# Patient Record
Sex: FEMALE | Race: WHITE | URBAN - METROPOLITAN AREA
[De-identification: names, ages, dates, MRNs, and addresses within clinical notes are randomized per-mention and may not be internally consistent; named-entity substitution may affect disease eponyms.]

---

## 2023-02-15 ENCOUNTER — OFFICE VISIT (OUTPATIENT)
Dept: INTERNAL MEDICINE CLINIC | Facility: CLINIC | Age: 43
End: 2023-02-15
Payer: COMMERCIAL

## 2023-02-15 VITALS
RESPIRATION RATE: 16 BRPM | BODY MASS INDEX: 39.83 KG/M2 | DIASTOLIC BLOOD PRESSURE: 70 MMHG | SYSTOLIC BLOOD PRESSURE: 120 MMHG | WEIGHT: 233.31 LBS | OXYGEN SATURATION: 96 % | HEIGHT: 64 IN | HEART RATE: 94 BPM

## 2023-02-15 DIAGNOSIS — E66.01 CLASS 3 SEVERE OBESITY WITH BODY MASS INDEX (BMI) OF 40.0 TO 44.9 IN ADULT, UNSPECIFIED OBESITY TYPE, UNSPECIFIED WHETHER SERIOUS COMORBIDITY PRESENT (HCC): ICD-10-CM

## 2023-02-15 DIAGNOSIS — Z00.00 ANNUAL PHYSICAL EXAM: Primary | ICD-10-CM

## 2023-02-15 DIAGNOSIS — Z23 IMMUNIZATION DUE: ICD-10-CM

## 2023-02-15 DIAGNOSIS — F41.9 ANXIETY: ICD-10-CM

## 2023-02-15 DIAGNOSIS — Z12.31 ENCOUNTER FOR SCREENING MAMMOGRAM FOR MALIGNANT NEOPLASM OF BREAST: ICD-10-CM

## 2023-02-15 DIAGNOSIS — Z86.2 HISTORY OF ANEMIA: ICD-10-CM

## 2023-02-15 DIAGNOSIS — E07.9 THYROID DISEASE: ICD-10-CM

## 2023-02-15 DIAGNOSIS — Z12.4 CERVICAL CANCER SCREENING: ICD-10-CM

## 2023-02-15 PROBLEM — D50.0 IRON DEFICIENCY ANEMIA SECONDARY TO BLOOD LOSS (CHRONIC): Status: ACTIVE | Noted: 2021-01-27

## 2023-02-15 PROBLEM — D50.0 IRON DEFICIENCY ANEMIA SECONDARY TO BLOOD LOSS (CHRONIC): Status: RESOLVED | Noted: 2021-01-27 | Resolved: 2023-02-15

## 2023-02-15 PROCEDURE — 90471 IMMUNIZATION ADMIN: CPT | Performed by: INTERNAL MEDICINE

## 2023-02-15 PROCEDURE — 90686 IIV4 VACC NO PRSV 0.5 ML IM: CPT | Performed by: INTERNAL MEDICINE

## 2023-02-15 PROCEDURE — 99386 PREV VISIT NEW AGE 40-64: CPT | Performed by: INTERNAL MEDICINE

## 2023-02-15 PROCEDURE — 3008F BODY MASS INDEX DOCD: CPT | Performed by: INTERNAL MEDICINE

## 2023-02-15 PROCEDURE — 3078F DIAST BP <80 MM HG: CPT | Performed by: INTERNAL MEDICINE

## 2023-02-15 PROCEDURE — 99204 OFFICE O/P NEW MOD 45 MIN: CPT | Performed by: INTERNAL MEDICINE

## 2023-02-15 PROCEDURE — 3074F SYST BP LT 130 MM HG: CPT | Performed by: INTERNAL MEDICINE

## 2023-02-15 RX ORDER — NORGESTIMATE AND ETHINYL ESTRADIOL 7DAYSX3 28
1 KIT ORAL DAILY
COMMUNITY
Start: 2022-12-01 | End: 2023-02-15 | Stop reason: ALTCHOICE

## 2023-02-15 RX ORDER — MULTIVITAMIN
1 TABLET ORAL DAILY
COMMUNITY

## 2023-02-23 ENCOUNTER — HOSPITAL ENCOUNTER (OUTPATIENT)
Dept: MAMMOGRAPHY | Age: 43
Discharge: HOME | End: 2023-02-23
Attending: INTERNAL MEDICINE
Payer: COMMERCIAL

## 2023-02-23 DIAGNOSIS — Z00.00 ANNUAL PHYSICAL EXAM: ICD-10-CM

## 2023-02-23 DIAGNOSIS — Z12.31 ENCOUNTER FOR SCREENING MAMMOGRAM FOR MALIGNANT NEOPLASM OF BREAST: ICD-10-CM

## 2023-02-23 PROCEDURE — 77063 BREAST TOMOSYNTHESIS BI: CPT | Performed by: INTERNAL MEDICINE

## 2023-02-23 PROCEDURE — 77067 SCR MAMMO BI INCL CAD: CPT | Performed by: INTERNAL MEDICINE

## 2023-02-24 ENCOUNTER — TELEPHONE (OUTPATIENT)
Dept: INTERNAL MEDICINE CLINIC | Facility: CLINIC | Age: 43
End: 2023-02-24

## 2023-03-01 ENCOUNTER — TELEPHONE (OUTPATIENT)
Dept: INTERNAL MEDICINE CLINIC | Facility: CLINIC | Age: 43
End: 2023-03-01

## 2023-03-01 NOTE — TELEPHONE ENCOUNTER
Received labs from 2/28/23:  Please let her know that thyroid/renal/liver labs wnl. Vitamin d/iron wnl. Vitamin b12 and folate above goal; can decrease those supplements. Not anemic or diabetic. Cholesterol overall controlled. Sent to scan.

## 2023-03-06 ENCOUNTER — TELEPHONE (OUTPATIENT)
Dept: INTERNAL MEDICINE CLINIC | Facility: CLINIC | Age: 43
End: 2023-03-06

## 2023-05-09 ENCOUNTER — OFFICE VISIT (OUTPATIENT)
Dept: INTERNAL MEDICINE CLINIC | Facility: CLINIC | Age: 43
End: 2023-05-09
Payer: COMMERCIAL

## 2023-05-09 VITALS
TEMPERATURE: 98 F | SYSTOLIC BLOOD PRESSURE: 112 MMHG | RESPIRATION RATE: 15 BRPM | BODY MASS INDEX: 40 KG/M2 | WEIGHT: 234.63 LBS | OXYGEN SATURATION: 97 % | DIASTOLIC BLOOD PRESSURE: 62 MMHG | HEART RATE: 74 BPM

## 2023-05-09 DIAGNOSIS — L30.9 DERMATITIS: Primary | ICD-10-CM

## 2023-05-09 DIAGNOSIS — E66.01 CLASS 3 SEVERE OBESITY DUE TO EXCESS CALORIES WITH BODY MASS INDEX (BMI) OF 40.0 TO 44.9 IN ADULT, UNSPECIFIED WHETHER SERIOUS COMORBIDITY PRESENT (HCC): ICD-10-CM

## 2023-05-09 DIAGNOSIS — I87.2 VENOUS INSUFFICIENCY: ICD-10-CM

## 2023-05-09 PROCEDURE — 3074F SYST BP LT 130 MM HG: CPT | Performed by: INTERNAL MEDICINE

## 2023-05-09 PROCEDURE — 3078F DIAST BP <80 MM HG: CPT | Performed by: INTERNAL MEDICINE

## 2023-05-09 PROCEDURE — 99214 OFFICE O/P EST MOD 30 MIN: CPT | Performed by: INTERNAL MEDICINE

## 2023-05-09 RX ORDER — TRIAMCINOLONE ACETONIDE 1 MG/G
CREAM TOPICAL 2 TIMES DAILY PRN
Qty: 60 G | Refills: 0 | Status: SHIPPED | OUTPATIENT
Start: 2023-05-09 | End: 2023-05-23

## 2023-07-25 ENCOUNTER — OFFICE VISIT (OUTPATIENT)
Dept: INTERNAL MEDICINE CLINIC | Facility: CLINIC | Age: 43
End: 2023-07-25
Payer: COMMERCIAL

## 2023-07-25 VITALS
DIASTOLIC BLOOD PRESSURE: 60 MMHG | HEART RATE: 82 BPM | OXYGEN SATURATION: 97 % | RESPIRATION RATE: 16 BRPM | WEIGHT: 237 LBS | BODY MASS INDEX: 41 KG/M2 | TEMPERATURE: 98 F | SYSTOLIC BLOOD PRESSURE: 110 MMHG

## 2023-07-25 DIAGNOSIS — U07.1 COVID: Primary | ICD-10-CM

## 2023-07-25 DIAGNOSIS — Z01.89 ROUTINE LAB DRAW: ICD-10-CM

## 2023-07-25 DIAGNOSIS — R09.81 NASAL CONGESTION: ICD-10-CM

## 2023-07-25 DIAGNOSIS — E66.01 CLASS 3 SEVERE OBESITY DUE TO EXCESS CALORIES WITH BODY MASS INDEX (BMI) OF 40.0 TO 44.9 IN ADULT, UNSPECIFIED WHETHER SERIOUS COMORBIDITY PRESENT (HCC): ICD-10-CM

## 2023-07-25 DIAGNOSIS — R09.89 CHEST CONGESTION: ICD-10-CM

## 2023-07-25 DIAGNOSIS — I87.2 VENOUS INSUFFICIENCY: ICD-10-CM

## 2023-07-25 PROCEDURE — 3074F SYST BP LT 130 MM HG: CPT | Performed by: INTERNAL MEDICINE

## 2023-07-25 PROCEDURE — 99214 OFFICE O/P EST MOD 30 MIN: CPT | Performed by: INTERNAL MEDICINE

## 2023-07-25 PROCEDURE — 3078F DIAST BP <80 MM HG: CPT | Performed by: INTERNAL MEDICINE

## 2023-07-25 RX ORDER — ALBUTEROL SULFATE 90 UG/1
2 AEROSOL, METERED RESPIRATORY (INHALATION) EVERY 4 HOURS PRN
Qty: 1 EACH | Refills: 0 | Status: SHIPPED | OUTPATIENT
Start: 2023-07-25

## 2023-07-25 RX ORDER — FLUTICASONE PROPIONATE 50 MCG
2 SPRAY, SUSPENSION (ML) NASAL DAILY
Qty: 18.2 ML | Refills: 0 | Status: SHIPPED | OUTPATIENT
Start: 2023-07-25

## 2023-09-27 ENCOUNTER — TELEPHONE (OUTPATIENT)
Dept: INTERNAL MEDICINE CLINIC | Facility: CLINIC | Age: 43
End: 2023-09-27

## 2023-09-27 NOTE — TELEPHONE ENCOUNTER
Ximena Acosta with [facility] called stating the patient's spouse called them about Labs that were done on 2/28/2023 and 2/24/2023. Spouse states patient did NOT have labs done on 2/24/2023 and needs our office to call   [phone] ([facility]) to dispute these labs on 2/24/2023.      Note patient has results posted for both 2/24 and 2/28.    [phone] Billing    Please advise if patient did indeed need these labs both days and verify with [facility].

## 2023-09-27 NOTE — TELEPHONE ENCOUNTER
Spoke with patient-states she had originally gone to [facility] on 2/24/23 and had her labs drawn. Was later called by [facility] stating they needed her to go back in as they had received additional lab orders (apparently initial faxed orders were only partially received). At that point patient was no longer fasting. She was advised to return on 2/28/23. When she returned on 2/28/23 she was advised that she would not be double billed for 2/24/23 draw. She is now receiving a bill for both dates of service. Tessa and spoke to Eleanor @[phone]. Advised Radha of above. Also informed her DV only ordered 1 set of labs on 2/15/23. Per Radha the following was drawn:    2/24/23 TSH, FREE T4, LIPID PANEL THYROID PEROXIDASE    2/28/23 TSH, FREE T4, LIPID PANEL THYROID PEROXIDASE, B12, FOLATE, IRON, TIBC, HGBA1C, CMP, CBC W/DIFF, VIT D    At:   [facility] [address], [facility] [address]. Per Eleanor, no record of pt being told she would not be billed for DOS 2/24/23. Relayed this to pt; unsure if there is anything else we can do. Please advise-TY! *Pt aware  CARLYN; will await response/CB next week.

## 2023-10-09 ENCOUNTER — OFFICE VISIT (OUTPATIENT)
Dept: INTERNAL MEDICINE CLINIC | Facility: CLINIC | Age: 43
End: 2023-10-09
Payer: COMMERCIAL

## 2023-10-09 VITALS
HEIGHT: 64 IN | SYSTOLIC BLOOD PRESSURE: 118 MMHG | OXYGEN SATURATION: 99 % | RESPIRATION RATE: 16 BRPM | WEIGHT: 237.63 LBS | DIASTOLIC BLOOD PRESSURE: 68 MMHG | HEART RATE: 92 BPM | BODY MASS INDEX: 40.57 KG/M2 | TEMPERATURE: 98 F

## 2023-10-09 DIAGNOSIS — E66.01 CLASS 3 SEVERE OBESITY WITH BODY MASS INDEX (BMI) OF 40.0 TO 44.9 IN ADULT, UNSPECIFIED OBESITY TYPE, UNSPECIFIED WHETHER SERIOUS COMORBIDITY PRESENT (HCC): ICD-10-CM

## 2023-10-09 DIAGNOSIS — Z12.4 CERVICAL CANCER SCREENING: ICD-10-CM

## 2023-10-09 DIAGNOSIS — N92.6 IRREGULAR MENSES: ICD-10-CM

## 2023-10-09 DIAGNOSIS — N93.9 ABNORMAL UTERINE BLEEDING (AUB): Primary | ICD-10-CM

## 2023-10-09 PROCEDURE — 99214 OFFICE O/P EST MOD 30 MIN: CPT | Performed by: INTERNAL MEDICINE

## 2023-10-09 PROCEDURE — 3008F BODY MASS INDEX DOCD: CPT | Performed by: INTERNAL MEDICINE

## 2023-10-09 PROCEDURE — 3074F SYST BP LT 130 MM HG: CPT | Performed by: INTERNAL MEDICINE

## 2023-10-09 PROCEDURE — 3078F DIAST BP <80 MM HG: CPT | Performed by: INTERNAL MEDICINE

## 2023-10-09 RX ORDER — RIBOFLAVIN (VITAMIN B2) 100 MG
100 TABLET ORAL DAILY
COMMUNITY

## 2023-10-09 RX ORDER — MEDROXYPROGESTERONE ACETATE 10 MG/1
10 TABLET ORAL DAILY
Qty: 30 TABLET | Refills: 0 | Status: SHIPPED | OUTPATIENT
Start: 2023-10-09

## 2023-10-09 RX ORDER — MEDROXYPROGESTERONE ACETATE 10 MG/1
10 TABLET ORAL DAILY
Qty: 30 TABLET | Refills: 0 | Status: SHIPPED | OUTPATIENT
Start: 2023-10-09 | End: 2023-10-09

## 2023-10-10 ENCOUNTER — PATIENT MESSAGE (OUTPATIENT)
Dept: INTERNAL MEDICINE CLINIC | Facility: CLINIC | Age: 43
End: 2023-10-10

## 2023-10-10 DIAGNOSIS — N92.6 IRREGULAR MENSES: ICD-10-CM

## 2023-10-10 DIAGNOSIS — N93.9 ABNORMAL UTERINE BLEEDING (AUB): Primary | ICD-10-CM

## 2023-10-10 NOTE — TELEPHONE ENCOUNTER
Language line used:   [facility] Agent [id number]    Called and spoke to patient's . He is to have patient wait for call or call office back. . Attempted to call patient two times. Number goes straight to [facility] and [facility] is not set up. Unable to leave message.

## 2023-10-10 NOTE — TELEPHONE ENCOUNTER
Should keep that gyne appt and ask to be placed on wait list for sooner appt. Please let her know that the US ordered will show the uterus and ovaries/pelvic area. However,  will change to transvaginal/transabdominal pelvic US since she is comfortable with that.

## 2023-10-10 NOTE — TELEPHONE ENCOUNTER
Earliest appt patient could schedule is Nov 6  OK to wait for appt or rec another ob/gyn?      Also asks if vaginal US would be better than abdominal ?     Patient is Sinhala speaking,  needed    [facility] #[id number]

## 2023-10-11 ENCOUNTER — PATIENT MESSAGE (OUTPATIENT)
Dept: INTERNAL MEDICINE CLINIC | Facility: CLINIC | Age: 43
End: 2023-10-11

## 2023-10-11 ENCOUNTER — TELEPHONE (OUTPATIENT)
Dept: INTERNAL MEDICINE CLINIC | Facility: CLINIC | Age: 43
End: 2023-10-11

## 2023-10-11 DIAGNOSIS — N93.9 ABNORMAL UTERINE BLEEDING (AUB): Primary | ICD-10-CM

## 2023-10-11 DIAGNOSIS — N92.6 IRREGULAR MENSES: ICD-10-CM

## 2023-10-11 DIAGNOSIS — E66.01 CLASS 3 SEVERE OBESITY WITH BODY MASS INDEX (BMI) OF 40.0 TO 44.9 IN ADULT, UNSPECIFIED OBESITY TYPE, UNSPECIFIED WHETHER SERIOUS COMORBIDITY PRESENT (HCC): ICD-10-CM

## 2023-10-11 NOTE — TELEPHONE ENCOUNTER
I honestly don't know why lab orders are not visible. There are previous orders for [facility] and [facility] (so patient has lab choice if issues). Re-ordered labs again under [facility]. Please follow-up with pt /lab location. If needs alternative lab location orders then to tell us where.

## 2023-10-11 NOTE — TELEPHONE ENCOUNTER
Pt excessively called the back line today to get lab orders faxed to [facility]. She was asked to not call the back line and to press option #2 when calling the office but she continued to do so. Pt called well over 5 times back to back today. I spoke to her as well as Yasmeen Palma. We both faxed the orders multiple times. Pt eventually was able to provide another fax number where the orders were sent and confirmation was received. Im unsure why this happens to (some) patients where [facility] is unable to locate orders on their end which is why we advise patients to  the orders from the office prior to going to [facility] or provide their fax number ahead of time.

## 2023-10-11 NOTE — TELEPHONE ENCOUNTER
Pt requested to have lab orders faxed to [address] [place] [address] #: [phone]    Orders faxed.

## 2023-10-13 ENCOUNTER — PATIENT MESSAGE (OUTPATIENT)
Dept: INTERNAL MEDICINE CLINIC | Facility: CLINIC | Age: 43
End: 2023-10-13

## 2023-10-13 ENCOUNTER — TELEPHONE (OUTPATIENT)
Dept: INTERNAL MEDICINE CLINIC | Facility: CLINIC | Age: 43
End: 2023-10-13

## 2023-10-13 DIAGNOSIS — D64.9 ANEMIA, UNSPECIFIED TYPE: Primary | ICD-10-CM

## 2023-10-13 LAB
ABSOLUTE BASOPHILS: 0.1
ABSOLUTE EOSINOPHILS: 0.5
ABSOLUTE LYMPHOCYTES: 1.7
ABSOLUTE MONOCYTES: 0.5
ALBUMIN/GLOBULIN RATIO: 1.8
ALBUMIN: 4.2 G/DL
ALKALINE PHOSPHATASE: 67
ALT: 15
AST: 21
BASOPHIL %: 1
BILIRUBIN, TOTAL: <0.2 MG/DL
BUN/CREATININE RATIO: 14
BUN: 10
CALCIUM: 8.6 MG/DL
CARBON DIOXIDE, TOTAL: 21 MMOL/L
CHLORIDE: 104
CORTISOL: 6.8 UG/DL
CREATININE: 0.7 MG/DL
EOSINOPHIL %: 6
GFR: 111 ML/MIN/1.73 SQUARE METERS (ref ?–60)
GLOBULIN: 2.4
GLUCOSE: 87
HCG,BETA SUBUNIT,QNT,SERUM: <1 MIU/ML
HEMATOCRIT: 24.8 %
HEMOGLOBIN: 7.9
LYMPHOCYTE %: 20
MCH: 28 PG
MCHC: 31.9 G/DL
MCV: 88 FL
MONOCYTE %: 6
NEUTROPHIL %: 66
NEUTROPHIL ABSOLUTE: 5.7
NRBC: 1
PLATELET COUNT: 373
POTASSIUM: 4.8
RBC: 2.82 X 10-6/UL
RDW: 14.2 %
SODIUM: 139
TOTAL PROTEIN: 6.6
TSH: 2.32 UIU/ML
VITAMIN D, 25-HYDROXY: 36.5 NG/ML
WBC: 8.6 X 10-3/UL

## 2023-10-16 ENCOUNTER — TELEPHONE (OUTPATIENT)
Dept: INTERNAL MEDICINE CLINIC | Facility: CLINIC | Age: 43
End: 2023-10-16

## 2023-10-16 ENCOUNTER — HOSPITAL ENCOUNTER (EMERGENCY)
Facility: HOSPITAL | Age: 43
Discharge: HOME | End: 2023-10-17
Attending: EMERGENCY MEDICINE
Payer: COMMERCIAL

## 2023-10-16 ENCOUNTER — APPOINTMENT (OUTPATIENT)
Dept: ULTRASOUND IMAGING | Facility: HOSPITAL | Age: 43
End: 2023-10-16
Attending: EMERGENCY MEDICINE
Payer: COMMERCIAL

## 2023-10-16 DIAGNOSIS — D64.9 SEVERE ANEMIA: Primary | ICD-10-CM

## 2023-10-16 DIAGNOSIS — N93.8 DUB (DYSFUNCTIONAL UTERINE BLEEDING): ICD-10-CM

## 2023-10-16 LAB
ALBUMIN SERPL-MCNC: 3.7 G/DL (ref 3.4–5)
ALBUMIN/GLOB SERPL: 0.9 {RATIO} (ref 1–2)
ALP LIVER SERPL-CCNC: 70 U/L
ALT SERPL-CCNC: 23 U/L
ANION GAP SERPL CALC-SCNC: 5 MMOL/L (ref 0–18)
ANTIBODY SCREEN: NEGATIVE
AST SERPL-CCNC: 13 U/L (ref 15–37)
BASOPHILS # BLD AUTO: 0.03 X10(3) UL (ref 0–0.2)
BASOPHILS NFR BLD AUTO: 0.3 %
BILIRUB SERPL-MCNC: 0.2 MG/DL (ref 0.1–2)
BUN BLD-MCNC: 10 MG/DL (ref 7–18)
CALCIUM BLD-MCNC: 9 MG/DL (ref 8.5–10.1)
CHLORIDE SERPL-SCNC: 108 MMOL/L (ref 98–112)
CO2 SERPL-SCNC: 26 MMOL/L (ref 21–32)
CREAT BLD-MCNC: 0.74 MG/DL
EGFRCR SERPLBLD CKD-EPI 2021: 104 ML/MIN/1.73M2 (ref 60–?)
EOSINOPHIL # BLD AUTO: 0.53 X10(3) UL (ref 0–0.7)
EOSINOPHIL NFR BLD AUTO: 5.5 %
ERYTHROCYTE [DISTWIDTH] IN BLOOD BY AUTOMATED COUNT: 17.2 %
GLOBULIN PLAS-MCNC: 3.9 G/DL (ref 2.8–4.4)
GLUCOSE BLD-MCNC: 101 MG/DL (ref 70–99)
HCG SERPL QL: NEGATIVE
HCT VFR BLD AUTO: 25.4 %
HGB BLD-MCNC: 7.1 G/DL
HGB BLD-MCNC: 7.7 G/DL
IMM GRANULOCYTES # BLD AUTO: 0.12 X10(3) UL (ref 0–1)
IMM GRANULOCYTES NFR BLD: 1.2 %
LYMPHOCYTES # BLD AUTO: 1.26 X10(3) UL (ref 1–4)
LYMPHOCYTES NFR BLD AUTO: 13.1 %
MCH RBC QN AUTO: 27.9 PG (ref 26–34)
MCHC RBC AUTO-ENTMCNC: 30.3 G/DL (ref 31–37)
MCV RBC AUTO: 92 FL
MONOCYTES # BLD AUTO: 0.53 X10(3) UL (ref 0.1–1)
MONOCYTES NFR BLD AUTO: 5.5 %
NEUTROPHILS # BLD AUTO: 7.17 X10 (3) UL (ref 1.5–7.7)
NEUTROPHILS # BLD AUTO: 7.17 X10(3) UL (ref 1.5–7.7)
NEUTROPHILS NFR BLD AUTO: 74.4 %
OSMOLALITY SERPL CALC.SUM OF ELEC: 287 MOSM/KG (ref 275–295)
PLATELET # BLD AUTO: 356 10(3)UL (ref 150–450)
POTASSIUM SERPL-SCNC: 3.7 MMOL/L (ref 3.5–5.1)
PROT SERPL-MCNC: 7.6 G/DL (ref 6.4–8.2)
RBC # BLD AUTO: 2.76 X10(6)UL
RH BLOOD TYPE: POSITIVE
RH BLOOD TYPE: POSITIVE
SODIUM SERPL-SCNC: 139 MMOL/L (ref 136–145)
WBC # BLD AUTO: 9.6 X10(3) UL (ref 4–11)

## 2023-10-16 PROCEDURE — 85018 HEMOGLOBIN: CPT | Performed by: EMERGENCY MEDICINE

## 2023-10-16 PROCEDURE — 80053 COMPREHEN METABOLIC PANEL: CPT

## 2023-10-16 PROCEDURE — 36430 TRANSFUSION BLD/BLD COMPNT: CPT

## 2023-10-16 PROCEDURE — 96361 HYDRATE IV INFUSION ADD-ON: CPT

## 2023-10-16 PROCEDURE — 86901 BLOOD TYPING SEROLOGIC RH(D): CPT

## 2023-10-16 PROCEDURE — 86901 BLOOD TYPING SEROLOGIC RH(D): CPT | Performed by: EMERGENCY MEDICINE

## 2023-10-16 PROCEDURE — 93975 VASCULAR STUDY: CPT | Performed by: EMERGENCY MEDICINE

## 2023-10-16 PROCEDURE — 99285 EMERGENCY DEPT VISIT HI MDM: CPT

## 2023-10-16 PROCEDURE — 86900 BLOOD TYPING SEROLOGIC ABO: CPT | Performed by: EMERGENCY MEDICINE

## 2023-10-16 PROCEDURE — 84703 CHORIONIC GONADOTROPIN ASSAY: CPT | Performed by: EMERGENCY MEDICINE

## 2023-10-16 PROCEDURE — 86900 BLOOD TYPING SEROLOGIC ABO: CPT

## 2023-10-16 PROCEDURE — 80053 COMPREHEN METABOLIC PANEL: CPT | Performed by: EMERGENCY MEDICINE

## 2023-10-16 PROCEDURE — 76856 US EXAM PELVIC COMPLETE: CPT | Performed by: EMERGENCY MEDICINE

## 2023-10-16 PROCEDURE — 96360 HYDRATION IV INFUSION INIT: CPT

## 2023-10-16 PROCEDURE — 76830 TRANSVAGINAL US NON-OB: CPT | Performed by: EMERGENCY MEDICINE

## 2023-10-16 PROCEDURE — 85025 COMPLETE CBC W/AUTO DIFF WBC: CPT

## 2023-10-16 PROCEDURE — 86920 COMPATIBILITY TEST SPIN: CPT

## 2023-10-16 PROCEDURE — 86850 RBC ANTIBODY SCREEN: CPT | Performed by: EMERGENCY MEDICINE

## 2023-10-16 PROCEDURE — 85025 COMPLETE CBC W/AUTO DIFF WBC: CPT | Performed by: EMERGENCY MEDICINE

## 2023-10-16 RX ORDER — TRANEXAMIC ACID 650 MG/1
1300 TABLET ORAL 3 TIMES DAILY
Qty: 30 TABLET | Refills: 0 | Status: SHIPPED | OUTPATIENT
Start: 2023-10-16 | End: 2023-10-30 | Stop reason: ALTCHOICE

## 2023-10-16 RX ORDER — PNV NO.95/FERROUS FUM/FOLIC AC 28MG-0.8MG
1 TABLET ORAL 2 TIMES DAILY
Qty: 180 TABLET | Refills: 0 | Status: SHIPPED | OUTPATIENT
Start: 2023-10-16

## 2023-10-16 RX ORDER — DOCUSATE SODIUM 100 MG/1
100 CAPSULE, LIQUID FILLED ORAL 2 TIMES DAILY
Qty: 180 CAPSULE | Refills: 0 | Status: SHIPPED | OUTPATIENT
Start: 2023-10-16

## 2023-10-16 NOTE — TELEPHONE ENCOUNTER
Language line used: [facility]      Patient calling for lab results and recommendations. Relayed DV note below. Patient v/u.

## 2023-10-16 NOTE — ED INITIAL ASSESSMENT (HPI)
Pt reports vaginal bleeding x15 days, was given med from pmd to attempt to stop bleeding but still having some and cramping, had low hgb level drawn

## 2023-10-16 NOTE — TELEPHONE ENCOUNTER
Please call pt and let her know that she has significant anemia. Hemoglobin 7.9. Iron studies were not yet sent to us. Awaiting results. However given history should do pelvic US and see gyne. Recommend iron BID (with colace to avoid constipation); sent in. Should repeat cbc in 1 week. Lab ordered under [facility]. Should go to ER if vaginal bleeding has not stopped or feeling unwell. Cortiosol and cmp unremarkable.  Vitamin d and TSH wnl

## 2023-10-16 NOTE — TELEPHONE ENCOUNTER
Received further results from 10/12/23  IRON 24  Iron saturation 7  TIBC 321    A1c 5.0     Pt already notified about iron supplements given anemia.  Pt currently in ER given symptoms and hgb drop

## 2023-10-16 NOTE — TELEPHONE ENCOUNTER
** if she is having worsening symptoms-blood clots and pain then should go to ER/urgent care. Please also see TE from earlier today:   \"Please call pt and let her know that she has significant anemia. Hemoglobin 7.9. Iron studies were not yet sent to us. Awaiting results. However given history should do pelvic US and see gyne. Recommend iron BID (with colace to avoid constipation); sent in. Should repeat cbc in 1 week. Lab ordered under [facility]. Should go to ER if vaginal bleeding has not stopped or feeling unwell. Cortiosol and cmp unremarkable.  Vitamin d and TSH wnl\"

## 2023-10-17 ENCOUNTER — TELEPHONE (OUTPATIENT)
Dept: CASE MANAGEMENT | Facility: HOSPITAL | Age: 43
End: 2023-10-17

## 2023-10-17 ENCOUNTER — TELEPHONE (OUTPATIENT)
Dept: OBGYN CLINIC | Facility: CLINIC | Age: 43
End: 2023-10-17

## 2023-10-17 VITALS
BODY MASS INDEX: 35.21 KG/M2 | DIASTOLIC BLOOD PRESSURE: 53 MMHG | HEIGHT: 68.5 IN | HEART RATE: 76 BPM | SYSTOLIC BLOOD PRESSURE: 97 MMHG | WEIGHT: 235 LBS | OXYGEN SATURATION: 100 % | RESPIRATION RATE: 20 BRPM | TEMPERATURE: 98 F

## 2023-10-17 NOTE — TELEPHONE ENCOUNTER
Dr. Bertram Cranker approves ER follow-up to see patient tomorrow. PSR calling patient now to schedule appointment.

## 2023-10-17 NOTE — CM/SW NOTE
Pharmacy called. Patient was prescribed  tranexamic acid (Lysteda) and is currently taking medroxyProgesterone acetate. There is an increased risk of blood clots taking both at the same time. Pharmacy is calling to clarify. Per MD note, patient is to stop taking the Progesterone and start taking tranexamic acid. Pharmacist verbalized understanding.

## 2023-10-17 NOTE — TELEPHONE ENCOUNTER
Pt was seen in the er yesterday due to vaginal bleeding. Pt states she is still bleeding and was advised to follow up with Dr. Tiffany Clancy. Please advise.

## 2023-10-17 NOTE — TELEPHONE ENCOUNTER
Per Dr. Pretty Sawyer- this patient needs to see the other OB group for ER follow-up based on the ER call schedule.

## 2023-10-17 NOTE — TELEPHONE ENCOUNTER
Spoke to pt with , read pt notes in system regarding ER follow up with other OB officec as per Dr Christian Llamas note. Patient stated multiple times she wanted to see Dr Tanner Aburto, please advise how to proceed.  Thank you

## 2023-10-18 ENCOUNTER — OFFICE VISIT (OUTPATIENT)
Dept: OBGYN CLINIC | Facility: CLINIC | Age: 43
End: 2023-10-18
Payer: COMMERCIAL

## 2023-10-18 VITALS
BODY MASS INDEX: 36 KG/M2 | HEART RATE: 104 BPM | WEIGHT: 237 LBS | DIASTOLIC BLOOD PRESSURE: 78 MMHG | SYSTOLIC BLOOD PRESSURE: 110 MMHG

## 2023-10-18 DIAGNOSIS — N93.9 ABNORMAL UTERINE BLEEDING (AUB): Primary | ICD-10-CM

## 2023-10-18 DIAGNOSIS — N84.0 ENDOMETRIAL POLYP: ICD-10-CM

## 2023-10-18 DIAGNOSIS — N92.1 MENOMETRORRHAGIA: ICD-10-CM

## 2023-10-18 LAB
BLOOD TYPE BARCODE: 5100
UNIT VOLUME: 275 ML

## 2023-10-18 PROCEDURE — 3078F DIAST BP <80 MM HG: CPT | Performed by: OBSTETRICS & GYNECOLOGY

## 2023-10-18 PROCEDURE — 3074F SYST BP LT 130 MM HG: CPT | Performed by: OBSTETRICS & GYNECOLOGY

## 2023-10-18 PROCEDURE — 99204 OFFICE O/P NEW MOD 45 MIN: CPT | Performed by: OBSTETRICS & GYNECOLOGY

## 2023-10-18 RX ORDER — MEDROXYPROGESTERONE ACETATE 10 MG/1
10 TABLET ORAL DAILY
Qty: 42 TABLET | Refills: 1 | Status: SHIPPED | OUTPATIENT
Start: 2023-10-18

## 2023-10-23 ENCOUNTER — TELEPHONE (OUTPATIENT)
Dept: INTERNAL MEDICINE CLINIC | Facility: CLINIC | Age: 43
End: 2023-10-23

## 2023-10-23 ENCOUNTER — PATIENT MESSAGE (OUTPATIENT)
Dept: OBGYN CLINIC | Facility: CLINIC | Age: 43
End: 2023-10-23

## 2023-10-23 LAB
HEMOGLOBIN A1C: 5 % (ref 4.8–5.6)
IRON SATURATION: 7 % (ref 15–55)
IRON: 24 (ref 27–159)

## 2023-10-26 ENCOUNTER — TELEPHONE (OUTPATIENT)
Dept: OBGYN CLINIC | Facility: CLINIC | Age: 43
End: 2023-10-26

## 2023-10-26 DIAGNOSIS — N84.0 ENDOMETRIAL POLYP: ICD-10-CM

## 2023-10-26 DIAGNOSIS — N92.1 MENOMETRORRHAGIA: ICD-10-CM

## 2023-10-26 DIAGNOSIS — N93.9 ABNORMAL UTERINE BLEEDING: Primary | ICD-10-CM

## 2023-10-26 NOTE — TELEPHONE ENCOUNTER
----- Message from Nicole Valdes MD sent at 10/18/2023  2:44 PM CDT -----  Surgeon: Dr. Nicole Valdes    Date:     Assistant: na    Type of Admit/Expected Discharge Department: Outpatient/Same Day    LOS: 0    Procedure Location: Main OR    PreOp Dx: Abnormal uterine bleeding, menometrorrhagia, endometrial polyp    Procedure: Hysteroscopy, dilation and curettage with Truclear or Myosure, possible endometrial polypectomy/myomectomy     Anticipated Time:  45 min    Anesthesia: Choice    Special Equipment/Comments:     Antibiotics: Initiate antibiotics per [facility] M[facility]dez adult preoperative prophylactic antibiotic protocol     Pre Op Orders: Initiate my Pre-op standing orders, if none exist please use [address]: Per Mario Barton    Medical clearance: No

## 2023-10-26 NOTE — TELEPHONE ENCOUNTER
Surgery scheduled for 11/24/23 at 2:30pm  Post op   Future Appointments   Date Time Provider Rhiannon Hernandez   12/11/2023 10:45 AM Awa Paula MD [facility]     Orders entered  Added to calendar  PA - no auth needed  REF# - [id number] 10/31/23 9:36am

## 2023-10-30 RX ORDER — MULTIVITAMIN WITH IRON
50 TABLET ORAL DAILY
COMMUNITY

## 2023-11-01 ENCOUNTER — TELEPHONE (OUTPATIENT)
Dept: OBGYN CLINIC | Facility: CLINIC | Age: 43
End: 2023-11-01

## 2023-11-01 NOTE — TELEPHONE ENCOUNTER
Pt called stating she was instructed not to take any medication 2 weeks prior to her surgery. Pt stated she only has one week left of Progester medication and is requesting medication for one more week because she will be without medication for 3 weeks and is afraid she will bleed a lot. Please advise.

## 2023-11-01 NOTE — TELEPHONE ENCOUNTER
Patient started the Provera on 10/22/23 and is supposed to take it for 2 weeks and then stop it for 2 weeks. Was told by prior admission that she should not take any medications for two weeks prior to surgery. Surgery scheduled for 11/24/23. Patient would like to continue taking the Provera daily to minimize bleeding. Can she continue taking the medication until 11/12/23?

## 2023-11-06 ENCOUNTER — LABORATORY ENCOUNTER (OUTPATIENT)
Dept: LAB | Facility: HOSPITAL | Age: 43
End: 2023-11-06
Attending: OBSTETRICS & GYNECOLOGY
Payer: COMMERCIAL

## 2023-11-06 DIAGNOSIS — Z01.818 PRE-OP TESTING: ICD-10-CM

## 2023-11-06 LAB
ERYTHROCYTE [DISTWIDTH] IN BLOOD BY AUTOMATED COUNT: 15.7 %
HCT VFR BLD AUTO: 29.6 %
HGB BLD-MCNC: 9 G/DL
MCH RBC QN AUTO: 28.7 PG (ref 26–34)
MCHC RBC AUTO-ENTMCNC: 30.4 G/DL (ref 31–37)
MCV RBC AUTO: 94.3 FL
PLATELET # BLD AUTO: 439 10(3)UL (ref 150–450)
RBC # BLD AUTO: 3.14 X10(6)UL
WBC # BLD AUTO: 7.8 X10(3) UL (ref 4–11)

## 2023-11-06 PROCEDURE — 85027 COMPLETE CBC AUTOMATED: CPT

## 2023-11-06 PROCEDURE — 36415 COLL VENOUS BLD VENIPUNCTURE: CPT

## 2023-11-10 ENCOUNTER — TELEPHONE (OUTPATIENT)
Dept: OBGYN CLINIC | Facility: CLINIC | Age: 43
End: 2023-11-10

## 2023-11-24 ENCOUNTER — ANESTHESIA EVENT (OUTPATIENT)
Dept: SURGERY | Facility: HOSPITAL | Age: 43
End: 2023-11-24
Payer: COMMERCIAL

## 2023-11-24 ENCOUNTER — HOSPITAL ENCOUNTER (OUTPATIENT)
Facility: HOSPITAL | Age: 43
Setting detail: HOSPITAL OUTPATIENT SURGERY
Discharge: HOME | End: 2023-11-24
Attending: OBSTETRICS & GYNECOLOGY | Admitting: OBSTETRICS & GYNECOLOGY
Payer: COMMERCIAL

## 2023-11-24 ENCOUNTER — ANESTHESIA (OUTPATIENT)
Dept: SURGERY | Facility: HOSPITAL | Age: 43
End: 2023-11-24
Payer: COMMERCIAL

## 2023-11-24 VITALS
RESPIRATION RATE: 16 BRPM | BODY MASS INDEX: 35.71 KG/M2 | TEMPERATURE: 98 F | OXYGEN SATURATION: 100 % | HEART RATE: 60 BPM | SYSTOLIC BLOOD PRESSURE: 131 MMHG | HEIGHT: 68.5 IN | WEIGHT: 238.38 LBS | DIASTOLIC BLOOD PRESSURE: 85 MMHG

## 2023-11-24 DIAGNOSIS — N92.1 MENOMETRORRHAGIA: ICD-10-CM

## 2023-11-24 DIAGNOSIS — N84.0 ENDOMETRIAL POLYP: ICD-10-CM

## 2023-11-24 DIAGNOSIS — N93.9 ABNORMAL UTERINE BLEEDING: ICD-10-CM

## 2023-11-24 DIAGNOSIS — Z01.818 PRE-OP TESTING: Primary | ICD-10-CM

## 2023-11-24 LAB — B-HCG UR QL: NEGATIVE

## 2023-11-24 PROCEDURE — 58558 HYSTEROSCOPY BIOPSY: CPT | Performed by: OBSTETRICS & GYNECOLOGY

## 2023-11-24 PROCEDURE — 0UDB8ZX EXTRACTION OF ENDOMETRIUM, VIA NATURAL OR ARTIFICIAL OPENING ENDOSCOPIC, DIAGNOSTIC: ICD-10-PCS | Performed by: OBSTETRICS & GYNECOLOGY

## 2023-11-24 RX ORDER — ACETAMINOPHEN 500 MG
1000 TABLET ORAL ONCE
Status: DISCONTINUED | OUTPATIENT
Start: 2023-11-24 | End: 2023-11-24 | Stop reason: HOSPADM

## 2023-11-24 RX ORDER — HYDROCODONE BITARTRATE AND ACETAMINOPHEN 5; 325 MG/1; MG/1
2 TABLET ORAL ONCE AS NEEDED
Status: COMPLETED | OUTPATIENT
Start: 2023-11-24 | End: 2023-11-24

## 2023-11-24 RX ORDER — MEPERIDINE HYDROCHLORIDE 25 MG/ML
12.5 INJECTION INTRAMUSCULAR; INTRAVENOUS; SUBCUTANEOUS AS NEEDED
Status: DISCONTINUED | OUTPATIENT
Start: 2023-11-24 | End: 2023-11-24

## 2023-11-24 RX ORDER — ONDANSETRON 2 MG/ML
INJECTION INTRAMUSCULAR; INTRAVENOUS AS NEEDED
Status: DISCONTINUED | OUTPATIENT
Start: 2023-11-24 | End: 2023-11-24 | Stop reason: SURG

## 2023-11-24 RX ORDER — LABETALOL HYDROCHLORIDE 5 MG/ML
5 INJECTION, SOLUTION INTRAVENOUS EVERY 5 MIN PRN
Status: DISCONTINUED | OUTPATIENT
Start: 2023-11-24 | End: 2023-11-24

## 2023-11-24 RX ORDER — HYDROMORPHONE HYDROCHLORIDE 1 MG/ML
0.4 INJECTION, SOLUTION INTRAMUSCULAR; INTRAVENOUS; SUBCUTANEOUS EVERY 5 MIN PRN
Status: DISCONTINUED | OUTPATIENT
Start: 2023-11-24 | End: 2023-11-24

## 2023-11-24 RX ORDER — NALOXONE HYDROCHLORIDE 0.4 MG/ML
80 INJECTION, SOLUTION INTRAMUSCULAR; INTRAVENOUS; SUBCUTANEOUS AS NEEDED
Status: DISCONTINUED | OUTPATIENT
Start: 2023-11-24 | End: 2023-11-24

## 2023-11-24 RX ORDER — HYDROCODONE BITARTRATE AND ACETAMINOPHEN 5; 325 MG/1; MG/1
1 TABLET ORAL ONCE AS NEEDED
Status: COMPLETED | OUTPATIENT
Start: 2023-11-24 | End: 2023-11-24

## 2023-11-24 RX ORDER — DEXAMETHASONE SODIUM PHOSPHATE 4 MG/ML
VIAL (ML) INJECTION AS NEEDED
Status: DISCONTINUED | OUTPATIENT
Start: 2023-11-24 | End: 2023-11-24 | Stop reason: SURG

## 2023-11-24 RX ORDER — SODIUM CHLORIDE, SODIUM LACTATE, POTASSIUM CHLORIDE, CALCIUM CHLORIDE 600; 310; 30; 20 MG/100ML; MG/100ML; MG/100ML; MG/100ML
INJECTION, SOLUTION INTRAVENOUS CONTINUOUS
Status: DISCONTINUED | OUTPATIENT
Start: 2023-11-24 | End: 2023-11-24

## 2023-11-24 RX ORDER — HYDROMORPHONE HYDROCHLORIDE 1 MG/ML
0.2 INJECTION, SOLUTION INTRAMUSCULAR; INTRAVENOUS; SUBCUTANEOUS EVERY 5 MIN PRN
Status: DISCONTINUED | OUTPATIENT
Start: 2023-11-24 | End: 2023-11-24

## 2023-11-24 RX ORDER — MIDAZOLAM HYDROCHLORIDE 1 MG/ML
1 INJECTION INTRAMUSCULAR; INTRAVENOUS EVERY 5 MIN PRN
Status: DISCONTINUED | OUTPATIENT
Start: 2023-11-24 | End: 2023-11-24

## 2023-11-24 RX ORDER — SCOLOPAMINE TRANSDERMAL SYSTEM 1 MG/1
1 PATCH, EXTENDED RELEASE TRANSDERMAL ONCE
Status: DISCONTINUED | OUTPATIENT
Start: 2023-11-24 | End: 2023-11-24 | Stop reason: HOSPADM

## 2023-11-24 RX ORDER — HYDROMORPHONE HYDROCHLORIDE 1 MG/ML
0.6 INJECTION, SOLUTION INTRAMUSCULAR; INTRAVENOUS; SUBCUTANEOUS EVERY 5 MIN PRN
Status: DISCONTINUED | OUTPATIENT
Start: 2023-11-24 | End: 2023-11-24

## 2023-11-24 RX ORDER — KETOROLAC TROMETHAMINE 30 MG/ML
INJECTION, SOLUTION INTRAMUSCULAR; INTRAVENOUS AS NEEDED
Status: DISCONTINUED | OUTPATIENT
Start: 2023-11-24 | End: 2023-11-24 | Stop reason: SURG

## 2023-11-24 RX ORDER — ONDANSETRON 2 MG/ML
4 INJECTION INTRAMUSCULAR; INTRAVENOUS EVERY 6 HOURS PRN
Status: DISCONTINUED | OUTPATIENT
Start: 2023-11-24 | End: 2023-11-24

## 2023-11-24 RX ORDER — ACETAMINOPHEN 500 MG
1000 TABLET ORAL ONCE AS NEEDED
Status: COMPLETED | OUTPATIENT
Start: 2023-11-24 | End: 2023-11-24

## 2023-11-24 RX ORDER — PROCHLORPERAZINE EDISYLATE 5 MG/ML
5 INJECTION INTRAMUSCULAR; INTRAVENOUS EVERY 8 HOURS PRN
Status: DISCONTINUED | OUTPATIENT
Start: 2023-11-24 | End: 2023-11-24

## 2023-11-24 RX ORDER — LIDOCAINE HYDROCHLORIDE 10 MG/ML
INJECTION, SOLUTION EPIDURAL; INFILTRATION; INTRACAUDAL; PERINEURAL AS NEEDED
Status: DISCONTINUED | OUTPATIENT
Start: 2023-11-24 | End: 2023-11-24 | Stop reason: SURG

## 2023-11-24 RX ADMIN — ONDANSETRON 4 MG: 2 INJECTION INTRAMUSCULAR; INTRAVENOUS at 14:50:00

## 2023-11-24 RX ADMIN — DEXAMETHASONE SODIUM PHOSPHATE 4 MG: 4 MG/ML VIAL (ML) INJECTION at 14:40:00

## 2023-11-24 RX ADMIN — KETOROLAC TROMETHAMINE 30 MG: 30 INJECTION, SOLUTION INTRAMUSCULAR; INTRAVENOUS at 15:03:00

## 2023-11-24 RX ADMIN — SODIUM CHLORIDE, SODIUM LACTATE, POTASSIUM CHLORIDE, CALCIUM CHLORIDE: 600; 310; 30; 20 INJECTION, SOLUTION INTRAVENOUS at 14:37:00

## 2023-11-24 RX ADMIN — LIDOCAINE HYDROCHLORIDE 50 MG: 10 INJECTION, SOLUTION EPIDURAL; INFILTRATION; INTRACAUDAL; PERINEURAL at 14:41:00

## 2023-11-24 NOTE — DISCHARGE INSTRUCTIONS
[facility] DISCHARGE INSTRUCTIONS     You have had an operation called a D&C. What to expect:  You may be somewhat fatigued because of the anesthetic  Bleeding is usually light to moderate. You may pass small blood clots  Light bleeding may last up to 2-3 weeks  Mild to moderate lower abdominal cramping     Activities:  Rest until tomorrow  No driving for 1-2 days  Drink plenty of fluids, but avoid alcohol  No tampons, douching or intercourse for 2-3 weeks  You may shower immediately, but avoid tub baths for 1 week    Call if you experience any of the following:  Persistent heavy bleeding  Severe pelvic pain  Temperature 101 F or higher    Follow Up: If you do not have a postoperative appointment, please call our office at [phone] within the next few days to schedule an appointment for 2-3 weeks from now. You received a drug called Toradol which is an Anti Inflammatory at: 3PM  If you are allowed to take Anti inflammatories (like Motrin, Aleve or Ibuprofen, Advil), do not take for six hours after Toradol dose.   Please report any suspected allergic reactions or bleeding issues to your doctor
no

## 2023-11-24 NOTE — OPERATIVE REPORT
Procedure Date: 11/24/2023    Pre-Operative Diagnosis: Abnormal uterine bleeding, menometrorrhagia    Post-Operative Diagnosis: Same    Procedure Performed: Hysteroscopy, dilation and curettage     Surgeon(s) and Role:     Daria Henriquez MD - Primary     Assistant(s):  ALTON     Surgical Findings: Normal endocervical and endometrial cavity with no focal lesions, polyps or growths noted. Normal endometrial lining. Normal tubal ostia bilaterally. Specimen: [1] Endometrial curettings     Estimated Blood Loss: 10 ml    Procedure:  After obtaining informed consent, the patient was brought into the operating room and placed on the operating room table in supine position. MAC anesthesia was performed and the patient then placed in dorsal lithotomy position. The perineum and vagina were then prepped and draped in the usual sterile fashion. Bimanual examination was then performed and the uterus noted to be 8 weeks sized, retroverted. A bivalved speculum was placed in the vagina and a single toothed tenaculum used to grasp the anterior lip of the cervix. The uterus was sounded to 7 cm. The cervix was then dilated to a #7 Hegar dilator. Diagnostic hysteroscopy was then performed with the above noted findings. Endometrial curettage was then performed with a small amount of tissue obtained and sent to pathology for examination. At the end of the procedure, all instruments were removed from the vagina with good hemostasis noted. The patient was repositioned in the supine position, awakened in the operating room and transferred to the recovery room in stable condition. Sponge and instrument counts were correct at the end of the procedure.

## 2023-11-24 NOTE — INTERVAL H&P NOTE
Pre-op Diagnosis: Abnormal uterine bleeding [N93.9]  Menometrorrhagia [N92.1]  Endometrial polyp [N84.0]    The above referenced H&P was reviewed by Kareem Garza MD on 11/24/2023, the patient was examined and no significant changes have occurred in the patient's condition since the H&P was performed. I discussed with the patient and/or legal representative the potential benefits, risks and side effects of this procedure; the likelihood of the patient achieving goals; and potential problems that might occur during recuperation. I discussed reasonable alternatives to the procedure, including risks, benefits and side effects related to the alternatives and risks related to not receiving this procedure. We will proceed with procedure as planned.

## 2023-12-15 ENCOUNTER — OFFICE VISIT (OUTPATIENT)
Dept: OBGYN CLINIC | Facility: CLINIC | Age: 43
End: 2023-12-15
Payer: COMMERCIAL

## 2023-12-15 VITALS
WEIGHT: 242.63 LBS | DIASTOLIC BLOOD PRESSURE: 68 MMHG | SYSTOLIC BLOOD PRESSURE: 122 MMHG | HEART RATE: 113 BPM | BODY MASS INDEX: 36 KG/M2

## 2023-12-15 DIAGNOSIS — N93.9 ABNORMAL UTERINE BLEEDING (AUB): Primary | ICD-10-CM

## 2023-12-15 DIAGNOSIS — N83.202 BILATERAL OVARIAN CYSTS: ICD-10-CM

## 2023-12-15 DIAGNOSIS — Z12.31 ENCOUNTER FOR SCREENING MAMMOGRAM FOR BREAST CANCER: ICD-10-CM

## 2023-12-15 DIAGNOSIS — N83.201 BILATERAL OVARIAN CYSTS: ICD-10-CM

## 2023-12-15 PROBLEM — N84.0 ENDOMETRIAL POLYP: Status: RESOLVED | Noted: 2023-10-18 | Resolved: 2023-12-15

## 2023-12-15 PROBLEM — N92.1 MENOMETRORRHAGIA: Status: RESOLVED | Noted: 2023-10-18 | Resolved: 2023-12-15

## 2023-12-15 PROCEDURE — 99214 OFFICE O/P EST MOD 30 MIN: CPT | Performed by: OBSTETRICS & GYNECOLOGY

## 2023-12-15 PROCEDURE — 3078F DIAST BP <80 MM HG: CPT | Performed by: OBSTETRICS & GYNECOLOGY

## 2023-12-15 PROCEDURE — 3074F SYST BP LT 130 MM HG: CPT | Performed by: OBSTETRICS & GYNECOLOGY

## 2023-12-15 NOTE — PROGRESS NOTES
Subjective:  Chief Complaint   Patient presents with    Post-Op     2wk PO Hysteroscopy      Translation through LLS    Patient presents for follow up, status post hysteroscopy, D&C. Currently without complaints. Objective:  Physical Examination:  General appearance: Well dressed, well nourished in no apparent distress  Neurologic/Psychiatric: Alert and oriented to person, place and time, mood normal, affect appropriate  Abdomen: Soft, non-tender, non-distended  Pelvic:    External genitalia- Normal, Bartholin's, urethra, skeins glands normal   Vagina- No vaginal lesions, no discharge   Cervix- No lesions, long/closed, no cervical motion tenderness   Uterus- Normal sized, anteverted, non-tender, no masses   Adnexa-  Non-tender, no masses    Assessment/Plan:  Normal post operative examination, doing well. Pathology results reviewed, show inactive endometrium. Desires pregnancy- offered referral to [facility] if no success after 6 mos of unprotected IC  Bilateral ovarian cysts- recommend follow up ultrasound one month  Abnormal uterine bleeding- recommend expectant management for 1-2 mos. If persistent AUB, recommend cycle with monthly progesterone therapy while attempting pregnancy, or OCP if not attempting pregnancy. Encouraged weight loss. Last Pap 2022 per patient. Encouraged yearly WWE. Diagnoses and all orders for this visit:    Abnormal uterine bleeding (AUB)    Bilateral ovarian cysts  -     US PELVIS W EV (CPT=76856/08772); Future    Encounter for screening mammogram for breast cancer  -     [facility] RHONDA 2D+3D SCREENING BILAT (CPT=77067/51492); Future      Return if symptoms worsen or fail to improve.

## 2024-01-31 ENCOUNTER — PATIENT MESSAGE (OUTPATIENT)
Dept: OBGYN CLINIC | Facility: CLINIC | Age: 44
End: 2024-01-31

## 2024-01-31 ENCOUNTER — HOSPITAL ENCOUNTER (OUTPATIENT)
Dept: ULTRASOUND IMAGING | Age: 44
Discharge: HOME | End: 2024-01-31
Attending: OBSTETRICS & GYNECOLOGY
Payer: COMMERCIAL

## 2024-01-31 DIAGNOSIS — N83.201 BILATERAL OVARIAN CYSTS: ICD-10-CM

## 2024-01-31 DIAGNOSIS — N83.201 RIGHT OVARIAN CYST: Primary | ICD-10-CM

## 2024-01-31 DIAGNOSIS — N83.202 BILATERAL OVARIAN CYSTS: ICD-10-CM

## 2024-01-31 PROCEDURE — 76830 TRANSVAGINAL US NON-OB: CPT | Performed by: OBSTETRICS & GYNECOLOGY

## 2024-01-31 PROCEDURE — 76856 US EXAM PELVIC COMPLETE: CPT | Performed by: OBSTETRICS & GYNECOLOGY

## 2024-01-31 NOTE — PROGRESS NOTES
Increased in size of right ovarian cyst, resolution of left ovarian cyst, still likely a functional cyst on right side.  Recommend repeat ultrasound in 6-8 wks at [facility].  Order placed.  Please notify patient.

## 2024-02-01 ENCOUNTER — TELEPHONE (OUTPATIENT)
Dept: OBGYN CLINIC | Facility: CLINIC | Age: 44
End: 2024-02-01

## 2024-02-01 NOTE — TELEPHONE ENCOUNTER
Uruguayan  Pt called (shortly after sending mcm) states she is worried and wants more info as to why Dr. LIN is req she get a repeat US. Please call back and advise. Ty

## 2024-02-01 NOTE — TELEPHONE ENCOUNTER
From: Donal Berry  To: Jeremy Pagan  Sent: 1/31/2024 8:04 PM CST  Subject: Text continue    I performed this last ultrasound in [place] at [address] [place] [address]. I would like to change and perform it in the [facility], unfortunately there are no appointments until April. My calm will not last until that date without knowing if something is wrong.! Is this same nonspecific echogenic nodule, measuring 9 x 7 mm, the same one that the ultrasound showed in the emergency room before the intervention where no nodule or polyp was seen? help me please thank you

## 2024-02-01 NOTE — PROGRESS NOTES
Patient called back.     Contacted patient results and recommendations given.   Patient verbalized understanding.  No further questions.

## 2024-03-05 ENCOUNTER — TELEPHONE (OUTPATIENT)
Dept: OBGYN CLINIC | Facility: CLINIC | Age: 44
End: 2024-03-05

## 2024-03-08 ENCOUNTER — HOSPITAL ENCOUNTER (OUTPATIENT)
Dept: MAMMOGRAPHY | Age: 44
Discharge: HOME | End: 2024-03-08
Attending: OBSTETRICS & GYNECOLOGY
Payer: COMMERCIAL

## 2024-03-08 DIAGNOSIS — Z12.31 ENCOUNTER FOR SCREENING MAMMOGRAM FOR BREAST CANCER: ICD-10-CM

## 2024-03-08 PROCEDURE — 77063 BREAST TOMOSYNTHESIS BI: CPT | Performed by: OBSTETRICS & GYNECOLOGY

## 2024-03-08 PROCEDURE — 77067 SCR MAMMO BI INCL CAD: CPT | Performed by: OBSTETRICS & GYNECOLOGY

## 2024-04-10 ENCOUNTER — HOSPITAL ENCOUNTER (OUTPATIENT)
Dept: ULTRASOUND IMAGING | Age: 44
Discharge: HOME | End: 2024-04-10
Attending: OBSTETRICS & GYNECOLOGY
Payer: COMMERCIAL

## 2024-04-10 DIAGNOSIS — N83.201 RIGHT OVARIAN CYST: ICD-10-CM

## 2024-04-10 PROCEDURE — 76830 TRANSVAGINAL US NON-OB: CPT | Performed by: OBSTETRICS & GYNECOLOGY

## 2024-04-10 PROCEDURE — 76856 US EXAM PELVIC COMPLETE: CPT | Performed by: OBSTETRICS & GYNECOLOGY

## 2024-07-08 ENCOUNTER — OFFICE VISIT (OUTPATIENT)
Dept: INTERNAL MEDICINE CLINIC | Facility: CLINIC | Age: 44
End: 2024-07-08
Payer: COMMERCIAL

## 2024-07-08 VITALS
SYSTOLIC BLOOD PRESSURE: 110 MMHG | DIASTOLIC BLOOD PRESSURE: 60 MMHG | WEIGHT: 249.19 LBS | TEMPERATURE: 98 F | OXYGEN SATURATION: 95 % | HEIGHT: 64.5 IN | RESPIRATION RATE: 17 BRPM | BODY MASS INDEX: 42.02 KG/M2 | HEART RATE: 92 BPM

## 2024-07-08 DIAGNOSIS — Z23 IMMUNIZATION DUE: ICD-10-CM

## 2024-07-08 DIAGNOSIS — D64.9 ANEMIA, UNSPECIFIED TYPE: ICD-10-CM

## 2024-07-08 DIAGNOSIS — Z00.00 ANNUAL PHYSICAL EXAM: Primary | ICD-10-CM

## 2024-07-08 DIAGNOSIS — Z12.4 CERVICAL CANCER SCREENING: ICD-10-CM

## 2024-07-08 DIAGNOSIS — E66.01 CLASS 3 SEVERE OBESITY WITH BODY MASS INDEX (BMI) OF 40.0 TO 44.9 IN ADULT, UNSPECIFIED OBESITY TYPE, UNSPECIFIED WHETHER SERIOUS COMORBIDITY PRESENT (HCC): ICD-10-CM

## 2024-07-08 PROCEDURE — 87624 HPV HI-RISK TYP POOLED RSLT: CPT | Performed by: INTERNAL MEDICINE

## 2024-07-08 PROCEDURE — 88175 CYTOPATH C/V AUTO FLUID REDO: CPT | Performed by: INTERNAL MEDICINE

## 2024-07-08 NOTE — PROGRESS NOTES
Subjective:   Donal Berry is a 43 year old female  who presents for Physical (.;)     AND the following:     Denies family hx of breast or colon cancer.  Denies breast or nipple changes.     Colon cancer screening: due 46yo   Breast cancer screening: due 3/2025   Cervical cancer screening: completed today.      Hx of AUB and anemia- has seen gyne.   Reports that symptoms have greatly since gyne procedure 12/2023. now menses is more regular.   Reports some HA at times with menses. No chronic HA.     Obesity-pt reports difficulty losing weight.   Pt not interested in medication.   Open to seeing nutritionist.       Remainder of ROS negative.     History/Other:    Chief Complaint Reviewed and Verified  Nursing Notes Reviewed and   Verified  Tobacco Reviewed  Allergies Reviewed  Medications Reviewed    Problem List Reviewed  Medical History Reviewed  Surgical History   Reviewed  OB Status Reviewed  Family History Reviewed  Social History   Reviewed         Current Outpatient Medications   Medication Sig Dispense Refill    vitamin B-12 50 MCG Oral Tab Take 1 tablet (50 mcg total) by mouth daily.      Magnesium 100 MG Oral Tab Take by mouth.      Ascorbic Acid (VITAMIN C) 100 MG Oral Tab Take 1 tablet (100 mg total) by mouth daily.      Multiple Vitamin (MULTI-DAY VITAMINS) Oral Tab Take 1 tablet by mouth daily.         Review of Systems:  Pertinent items are noted in HPI.  A comprehensive 10 point review of systems was completed.  Pertinent positives and negatives noted in the the HPI.        Objective:   /60 (BP Location: Right arm, Patient Position: Sitting, Cuff Size: large)   Pulse 92   Temp 97.9 °F (36.6 °C) (Temporal)   Resp 17   Ht 5' 4.5\" (1.638 m)   Wt 249 lb 3.2 oz (113 kg)   LMP 06/29/2024 (Exact Date)   SpO2 95%   BMI 42.11 kg/m²  Estimated body mass index is 42.11 kg/m² as calculated from the following:    Height as of this encounter: 5' 4.5\" (1.638 m).    Weight as of this  encounter: 249 lb 3.2 oz (113 kg).  PHYSICAL EXAM:   General: no acute distress   Eyes: pupils equal and reactive; EOMI; sclera normal; conjunctiva normal   ENT:without erythema or exudate  Neck: trachea midline; no adenopathy; thyroid not enlarged   Hematologic/lymphatic:no cervical lymphadenopathy; no supraclavicular adenopathy   Respiratory: no increased work of breathing; good air exchange; CTAB; no crackles or wheezing   Cardiovascular: RRR; S1, S2; no murmurs; no carotid bruits; no edema   Gastrointestinal: normal bowel sounds; soft; non-distended; non-tender  Neurological: awake, alert, oriented x3; CNII-XII grossly intact;   MSK: full ROM; strength 5/5  Behavioral/Psych: euthymic; appropriate affect   Breasts: symmetrical; no masses or nipple discharge or rashes/lesions noted   : no external vaginal lesions noted. No copious discharge; cervical os visualized. No pain on exam       Assessment & Plan:   Donal was seen today for physical.    Diagnoses and all orders for this visit:    Annual physical exam  -     Vitamin D; Future  -     CBC With Differential With Platelet; Future  -     Comp Metabolic Panel (14); Future  -     Hemoglobin A1C; Future  -     Cancel: TSH W Reflex To Free T4; Future  -     Lipid Panel; Future  -     Iron And Tibc; Future  -     B12 AND FOLATE; Future  -     ThinPrep PAP with HPV Reflex Request; Future  -     TSH and Free T4 [E]; Future  -     Triiodothyronine,Free,Serum [E]; Future  -     Cortisol [E]; Future  -     Thyroid Peroxidase (TPO) AB [E]; Future  -     TdaP (Adacel, Boostrix) [81587]    Anemia, unspecified type  -     CBC With Differential With Platelet; Future  -     Iron And Tibc; Future  -     B12 AND FOLATE; Future  -     TSH and Free T4 [E]; Future  -     Triiodothyronine,Free,Serum [E]; Future  -     Thyroid Peroxidase (TPO) AB [E]; Future    Cervical cancer screening  -     ThinPrep PAP with HPV Reflex Request; Future    Class 3 severe obesity with body mass  index (BMI) of 40.0 to 44.9 in adult, unspecified obesity type, unspecified whether serious comorbidity present (HCC)  -     DIETITIAN EDUCATION INITIAL, DIET (INTERNAL)  -     TSH and Free T4 [E]; Future  -     Triiodothyronine,Free,Serum [E]; Future  -     Testosterone,Free And Total (Female/Child) [E]; Future  -     Cortisol [E]; Future  -     Thyroid Peroxidase (TPO) AB [E]; Future  -follow-up     Immunization due  -     Tdap                 Tamica Reyes MD

## 2024-07-11 LAB
.: NORMAL
.: NORMAL

## 2024-07-12 LAB — HPV E6+E7 MRNA CVX QL NAA+PROBE: NEGATIVE

## 2024-07-13 ENCOUNTER — LAB ENCOUNTER (OUTPATIENT)
Dept: LAB | Facility: HOSPITAL | Age: 44
End: 2024-07-13
Attending: INTERNAL MEDICINE
Payer: COMMERCIAL

## 2024-07-13 DIAGNOSIS — Z00.00 ANNUAL PHYSICAL EXAM: ICD-10-CM

## 2024-07-13 DIAGNOSIS — E66.01 CLASS 3 SEVERE OBESITY WITH BODY MASS INDEX (BMI) OF 40.0 TO 44.9 IN ADULT, UNSPECIFIED OBESITY TYPE, UNSPECIFIED WHETHER SERIOUS COMORBIDITY PRESENT (HCC): ICD-10-CM

## 2024-07-13 DIAGNOSIS — D64.9 ANEMIA, UNSPECIFIED TYPE: ICD-10-CM

## 2024-07-13 LAB
ALBUMIN SERPL-MCNC: 3.8 G/DL (ref 3.4–5)
ALBUMIN/GLOB SERPL: 1 {RATIO} (ref 1–2)
ALP LIVER SERPL-CCNC: 84 U/L
ALT SERPL-CCNC: 25 U/L
ANION GAP SERPL CALC-SCNC: 9 MMOL/L (ref 0–18)
AST SERPL-CCNC: 12 U/L (ref 15–37)
BASOPHILS # BLD AUTO: 0.04 X10(3) UL (ref 0–0.2)
BASOPHILS NFR BLD AUTO: 0.7 %
BILIRUB SERPL-MCNC: 0.3 MG/DL (ref 0.1–2)
BUN BLD-MCNC: 12 MG/DL (ref 9–23)
CALCIUM BLD-MCNC: 8.6 MG/DL (ref 8.5–10.1)
CHLORIDE SERPL-SCNC: 108 MMOL/L (ref 98–112)
CHOLEST SERPL-MCNC: 193 MG/DL (ref ?–200)
CO2 SERPL-SCNC: 23 MMOL/L (ref 21–32)
CORTIS SERPL-MCNC: 9.7 UG/DL
CREAT BLD-MCNC: 0.69 MG/DL
EGFRCR SERPLBLD CKD-EPI 2021: 110 ML/MIN/1.73M2 (ref 60–?)
EOSINOPHIL # BLD AUTO: 0.42 X10(3) UL (ref 0–0.7)
EOSINOPHIL NFR BLD AUTO: 7 %
ERYTHROCYTE [DISTWIDTH] IN BLOOD BY AUTOMATED COUNT: 14.2 %
EST. AVERAGE GLUCOSE BLD GHB EST-MCNC: 108 MG/DL (ref 68–126)
FASTING PATIENT LIPID ANSWER: YES
FASTING STATUS PATIENT QL REPORTED: YES
FOLATE SERPL-MCNC: 32.1 NG/ML (ref 8.7–?)
GLOBULIN PLAS-MCNC: 3.8 G/DL (ref 2.8–4.4)
GLUCOSE BLD-MCNC: 88 MG/DL (ref 70–99)
HBA1C MFR BLD: 5.4 % (ref ?–5.7)
HCT VFR BLD AUTO: 37 %
HDLC SERPL-MCNC: 54 MG/DL (ref 40–59)
HGB BLD-MCNC: 12.2 G/DL
IMM GRANULOCYTES # BLD AUTO: 0.03 X10(3) UL (ref 0–1)
IMM GRANULOCYTES NFR BLD: 0.5 %
IRON SATN MFR SERPL: 11 %
IRON SERPL-MCNC: 43 UG/DL
LDLC SERPL CALC-MCNC: 116 MG/DL (ref ?–100)
LYMPHOCYTES # BLD AUTO: 1.18 X10(3) UL (ref 1–4)
LYMPHOCYTES NFR BLD AUTO: 19.7 %
MCH RBC QN AUTO: 28.5 PG (ref 26–34)
MCHC RBC AUTO-ENTMCNC: 33 G/DL (ref 31–37)
MCV RBC AUTO: 86.4 FL
MONOCYTES # BLD AUTO: 0.49 X10(3) UL (ref 0.1–1)
MONOCYTES NFR BLD AUTO: 8.2 %
NEUTROPHILS # BLD AUTO: 3.83 X10 (3) UL (ref 1.5–7.7)
NEUTROPHILS # BLD AUTO: 3.83 X10(3) UL (ref 1.5–7.7)
NEUTROPHILS NFR BLD AUTO: 63.9 %
NONHDLC SERPL-MCNC: 139 MG/DL (ref ?–130)
OSMOLALITY SERPL CALC.SUM OF ELEC: 289 MOSM/KG (ref 275–295)
PLATELET # BLD AUTO: 315 10(3)UL (ref 150–450)
POTASSIUM SERPL-SCNC: 3.8 MMOL/L (ref 3.5–5.1)
PROT SERPL-MCNC: 7.6 G/DL (ref 6.4–8.2)
RBC # BLD AUTO: 4.28 X10(6)UL
SODIUM SERPL-SCNC: 140 MMOL/L (ref 136–145)
T3FREE SERPL-MCNC: 2.34 PG/ML (ref 2.4–4.2)
T4 FREE SERPL-MCNC: 0.8 NG/DL (ref 0.8–1.7)
THYROPEROXIDASE AB SERPL-ACNC: <28 U/ML (ref ?–60)
TIBC SERPL-MCNC: 389 UG/DL (ref 240–450)
TRANSFERRIN SERPL-MCNC: 261 MG/DL (ref 200–360)
TRIGL SERPL-MCNC: 127 MG/DL (ref 30–149)
TSI SER-ACNC: 1.55 MIU/ML (ref 0.36–3.74)
VIT B12 SERPL-MCNC: >2000 PG/ML (ref 193–986)
VIT D+METAB SERPL-MCNC: 32.7 NG/ML (ref 30–100)
VLDLC SERPL CALC-MCNC: 22 MG/DL (ref 0–30)
WBC # BLD AUTO: 6 X10(3) UL (ref 4–11)

## 2024-07-13 PROCEDURE — 83036 HEMOGLOBIN GLYCOSYLATED A1C: CPT

## 2024-07-13 PROCEDURE — 83540 ASSAY OF IRON: CPT

## 2024-07-13 PROCEDURE — 36415 COLL VENOUS BLD VENIPUNCTURE: CPT

## 2024-07-13 PROCEDURE — 83550 IRON BINDING TEST: CPT

## 2024-07-13 PROCEDURE — 84410 TESTOSTERONE BIOAVAILABLE: CPT

## 2024-07-13 PROCEDURE — 82306 VITAMIN D 25 HYDROXY: CPT

## 2024-07-13 PROCEDURE — 80061 LIPID PANEL: CPT

## 2024-07-13 PROCEDURE — 80053 COMPREHEN METABOLIC PANEL: CPT

## 2024-07-13 PROCEDURE — 84443 ASSAY THYROID STIM HORMONE: CPT

## 2024-07-13 PROCEDURE — 86376 MICROSOMAL ANTIBODY EACH: CPT

## 2024-07-13 PROCEDURE — 84481 FREE ASSAY (FT-3): CPT

## 2024-07-13 PROCEDURE — 82533 TOTAL CORTISOL: CPT

## 2024-07-13 PROCEDURE — 85025 COMPLETE CBC W/AUTO DIFF WBC: CPT

## 2024-07-13 PROCEDURE — 82746 ASSAY OF FOLIC ACID SERUM: CPT

## 2024-07-13 PROCEDURE — 82607 VITAMIN B-12: CPT

## 2024-07-13 PROCEDURE — 84439 ASSAY OF FREE THYROXINE: CPT

## 2024-07-15 DIAGNOSIS — R94.6 ABNORMAL THYROID FUNCTION TEST: Primary | ICD-10-CM

## 2024-07-15 DIAGNOSIS — E61.1 LOW IRON: ICD-10-CM

## 2024-07-15 RX ORDER — PNV NO.95/FERROUS FUM/FOLIC AC 28MG-0.8MG
1 TABLET ORAL DAILY
Qty: 90 TABLET | Refills: 0 | Status: SHIPPED | OUTPATIENT
Start: 2024-07-15

## 2024-07-15 RX ORDER — DOCUSATE SODIUM 100 MG/1
100 CAPSULE, LIQUID FILLED ORAL
Qty: 90 CAPSULE | Refills: 0 | Status: SHIPPED | OUTPATIENT
Start: 2024-07-15

## 2024-07-19 LAB
SEX HORM BIND GLOB: 19.1 NMOL/L
TESTOST % FREE+WEAK BND: 22.4 %
TESTOST FREE+WEAK BND: 2 NG/DL
TESTOSTERONE TOT /MS: 8.8 NG/DL

## 2024-11-23 NOTE — TELEPHONE ENCOUNTER
11/23/2024    Alanna Graves  [address]  [facility] [address]      Dear Alanna,    There’s no question about it - preventive care can save lives. Many health problems start out silently without symptoms. Preventive care is often the only way to catch these problems in early stages, when they can be more successfully treated.     Our records show that you are due for the screening(s) listed below. If you have completed these screening(s), please call us so we can update your record.    Screening Pap  Pap Test: Cervical cancer is usually slow growing. It is preventable and it can be cured if found early. The Pap test is the most effective cancer screening test.     Your health is important to us. Please feel free to call my office at [phone] or make an appointment to discuss the best screening options for you.     Sincerely,      Maida Crum MD   [facility] [place]   [address]  [facility] [address]  Dept: [phone]  Dept Fax: [phone]     Enclosures:    Why Have a Pap Test?  A pap test looks for early signs of cancer in your cervix. Early changes in cervical cells don't cause symptoms. Often the only way to find them is with a Pap test. A Pap test can find these problems early, when they are easier to treat. Pap tests can also find some infections of the cervix and vagina.   What is a Pap test?  A Pap test is a procedure that helps find changes in the cervix that may lead to cancer. The cervix is the part of the uterus that opens into the vagina. For this test, a small sample of cells is taken from the cervix. This is done in your healthcare provider’s office. The cells are then examined in a lab. A Pap test is a safe procedure. It takes just a few minutes and causes little or no discomfort.   The HPV connection  Human papillomavirus (HPV) is a group of viruses that spread through skin contact and sex. Some types cause cell changes (dysplasia) in the  Spoke to pt in office, she is having surgery on 11/24. She is requested a note for work for her spouse stating she is having surgery on that day so he can request off for work. Spouse info is in emergency contacts.  Note can be sent to [employer], thank you cervix that can lead to cancer. In fact, HPV infection is the biggest risk factor for cervical cancer. Healthcare providers can now test for HPV. Testing for HPV is often done with the Pap test. That’s why it’s important to have Pap tests as advised by your healthcare provider. This helps ensure that any abnormal cells will be found. They can be treated before they become cancer.   Who should have a Pap test and HPV test?  Ask your healthcare provider:    When to start having Pap tests  If you should have an HPV test at the same time  How often to have tests    The American Cancer Society advises:   Have your first Pap test at age 21, and then every 3 years until age 29. HPV testing is not advised during this time. But it may be done to follow up on an abnormal Pap test.  Starting at age 30, have a Pap test with an HPV test every 5 years. This should be done until age 65. Another option for people age 30 to 65 is just the Pap test every 3 years.  You may need a different test schedule if you are at high risk for cervical cancer. Risk factors include having HIV, a weak immune system, or exposure to the medicine KEVIN while your mother was pregnant with you. Talk with your provider about the best schedule for you.  If you’re over 65, had regular tests for the last 10 years, and no abnormal results in the last 20 years, you may stop the tests.  If you had a hysterectomy that included removing your cervix, you can stop tests unless it was done to treat cervical cancer or precancer. If you still have your cervix, you should have tests in line with the above guidelines.  Routine tests don't need to be done each year. But if a test is abnormal, your provider will tell you how often to be tested.   People who have had the HPV vaccine should still follow these guidelines.  If you had cervical cancer, talk with your provider about the test plan that's best for you.  Claudia last reviewed this educational content on 6/1/2020  ©  5323-8246 [employer]. All rights reserved. This information is not intended as a substitute for professional medical care. Always follow your healthcare professional's instructions.        [facility] offers online access to your health information via [email or web address]

## 2024-12-02 ENCOUNTER — TELEPHONE (OUTPATIENT)
Dept: OBGYN CLINIC | Facility: CLINIC | Age: 44
End: 2024-12-02

## 2024-12-31 ENCOUNTER — HOSPITAL ENCOUNTER (OUTPATIENT)
Dept: ULTRASOUND IMAGING | Age: 44
Discharge: HOME | End: 2024-12-31
Attending: INTERNAL MEDICINE
Payer: COMMERCIAL

## 2024-12-31 DIAGNOSIS — R94.6 ABNORMAL THYROID FUNCTION TEST: ICD-10-CM

## 2024-12-31 PROCEDURE — 76536 US EXAM OF HEAD AND NECK: CPT | Performed by: INTERNAL MEDICINE

## 2025-01-07 ENCOUNTER — PATIENT MESSAGE (OUTPATIENT)
Dept: OBGYN CLINIC | Facility: CLINIC | Age: 45
End: 2025-01-07

## 2025-01-15 ENCOUNTER — LAB ENCOUNTER (OUTPATIENT)
Dept: LAB | Facility: HOSPITAL | Age: 45
End: 2025-01-15
Attending: INTERNAL MEDICINE
Payer: COMMERCIAL

## 2025-01-15 DIAGNOSIS — R94.6 ABNORMAL THYROID FUNCTION TEST: ICD-10-CM

## 2025-01-15 DIAGNOSIS — E61.1 LOW IRON: ICD-10-CM

## 2025-01-15 LAB
BASOPHILS # BLD AUTO: 0.05 X10(3) UL (ref 0–0.2)
BASOPHILS NFR BLD AUTO: 0.6 %
EOSINOPHIL # BLD AUTO: 0.38 X10(3) UL (ref 0–0.7)
EOSINOPHIL NFR BLD AUTO: 4.8 %
ERYTHROCYTE [DISTWIDTH] IN BLOOD BY AUTOMATED COUNT: 15.9 %
HCT VFR BLD AUTO: 26.1 %
HGB BLD-MCNC: 8.2 G/DL
IMM GRANULOCYTES # BLD AUTO: 0.06 X10(3) UL (ref 0–1)
IMM GRANULOCYTES NFR BLD: 0.8 %
IRON SATN MFR SERPL: 7 %
IRON SERPL-MCNC: 23 UG/DL
LYMPHOCYTES # BLD AUTO: 1.64 X10(3) UL (ref 1–4)
LYMPHOCYTES NFR BLD AUTO: 20.5 %
MCH RBC QN AUTO: 27.9 PG (ref 26–34)
MCHC RBC AUTO-ENTMCNC: 31.4 G/DL (ref 31–37)
MCV RBC AUTO: 88.8 FL
MONOCYTES # BLD AUTO: 0.53 X10(3) UL (ref 0.1–1)
MONOCYTES NFR BLD AUTO: 6.6 %
NEUTROPHILS # BLD AUTO: 5.34 X10 (3) UL (ref 1.5–7.7)
NEUTROPHILS # BLD AUTO: 5.34 X10(3) UL (ref 1.5–7.7)
NEUTROPHILS NFR BLD AUTO: 66.7 %
PLATELET # BLD AUTO: 329 10(3)UL (ref 150–450)
RBC # BLD AUTO: 2.94 X10(6)UL
T3FREE SERPL-MCNC: 2.6 PG/ML (ref 2.4–4.2)
T4 FREE SERPL-MCNC: 0.8 NG/DL (ref 0.8–1.7)
TOTAL IRON BINDING CAPACITY: 327 UG/DL (ref 250–425)
TRANSFERRIN SERPL-MCNC: 266 MG/DL (ref 250–380)
TSI SER-ACNC: 1.33 UIU/ML (ref 0.55–4.78)
WBC # BLD AUTO: 8 X10(3) UL (ref 4–11)

## 2025-01-15 PROCEDURE — 85025 COMPLETE CBC W/AUTO DIFF WBC: CPT

## 2025-01-15 PROCEDURE — 84443 ASSAY THYROID STIM HORMONE: CPT

## 2025-01-15 PROCEDURE — 84481 FREE ASSAY (FT-3): CPT

## 2025-01-15 PROCEDURE — 83550 IRON BINDING TEST: CPT

## 2025-01-15 PROCEDURE — 84439 ASSAY OF FREE THYROXINE: CPT

## 2025-01-15 PROCEDURE — 83540 ASSAY OF IRON: CPT

## 2025-01-15 PROCEDURE — 36415 COLL VENOUS BLD VENIPUNCTURE: CPT

## 2025-01-17 ENCOUNTER — APPOINTMENT (OUTPATIENT)
Dept: ULTRASOUND IMAGING | Facility: HOSPITAL | Age: 45
End: 2025-01-17
Attending: EMERGENCY MEDICINE
Payer: COMMERCIAL

## 2025-01-17 ENCOUNTER — HOSPITAL ENCOUNTER (EMERGENCY)
Facility: HOSPITAL | Age: 45
Discharge: HOME | End: 2025-01-18
Attending: EMERGENCY MEDICINE
Payer: COMMERCIAL

## 2025-01-17 DIAGNOSIS — N92.1 MENOMETRORRHAGIA: Primary | ICD-10-CM

## 2025-01-17 DIAGNOSIS — D64.9 ANEMIA, UNSPECIFIED TYPE: ICD-10-CM

## 2025-01-17 LAB
ALBUMIN SERPL-MCNC: 3.9 G/DL (ref 3.2–4.8)
ALBUMIN/GLOB SERPL: 1.2 {RATIO} (ref 1–2)
ALP LIVER SERPL-CCNC: 68 U/L
ALT SERPL-CCNC: 24 U/L
ANION GAP SERPL CALC-SCNC: 8 MMOL/L (ref 0–18)
ANTIBODY SCREEN: NEGATIVE
AST SERPL-CCNC: 25 U/L (ref ?–34)
BASOPHILS # BLD AUTO: 0.04 X10(3) UL (ref 0–0.2)
BASOPHILS NFR BLD AUTO: 0.4 %
BILIRUB SERPL-MCNC: 0.2 MG/DL (ref 0.3–1.2)
BUN BLD-MCNC: 12 MG/DL (ref 9–23)
CALCIUM BLD-MCNC: 9.2 MG/DL (ref 8.7–10.6)
CHLORIDE SERPL-SCNC: 103 MMOL/L (ref 98–112)
CO2 SERPL-SCNC: 27 MMOL/L (ref 21–32)
CREAT BLD-MCNC: 0.76 MG/DL
EGFRCR SERPLBLD CKD-EPI 2021: 99 ML/MIN/1.73M2 (ref 60–?)
EOSINOPHIL # BLD AUTO: 0.38 X10(3) UL (ref 0–0.7)
EOSINOPHIL NFR BLD AUTO: 4.2 %
ERYTHROCYTE [DISTWIDTH] IN BLOOD BY AUTOMATED COUNT: 17.4 %
GLOBULIN PLAS-MCNC: 3.3 G/DL (ref 2–3.5)
GLUCOSE BLD-MCNC: 98 MG/DL (ref 70–99)
HCG SERPL QL: NEGATIVE
HCT VFR BLD AUTO: 21.9 %
HGB BLD-MCNC: 7.1 G/DL
IMM GRANULOCYTES # BLD AUTO: 0.13 X10(3) UL (ref 0–1)
IMM GRANULOCYTES NFR BLD: 1.4 %
LYMPHOCYTES # BLD AUTO: 1.54 X10(3) UL (ref 1–4)
LYMPHOCYTES NFR BLD AUTO: 16.9 %
MCH RBC QN AUTO: 29.1 PG (ref 26–34)
MCHC RBC AUTO-ENTMCNC: 32.4 G/DL (ref 31–37)
MCV RBC AUTO: 89.8 FL
MONOCYTES # BLD AUTO: 0.55 X10(3) UL (ref 0.1–1)
MONOCYTES NFR BLD AUTO: 6 %
NEUTROPHILS # BLD AUTO: 6.47 X10 (3) UL (ref 1.5–7.7)
NEUTROPHILS # BLD AUTO: 6.47 X10(3) UL (ref 1.5–7.7)
NEUTROPHILS NFR BLD AUTO: 71.1 %
OSMOLALITY SERPL CALC.SUM OF ELEC: 286 MOSM/KG (ref 275–295)
PLATELET # BLD AUTO: 309 10(3)UL (ref 150–450)
POTASSIUM SERPL-SCNC: 3.9 MMOL/L (ref 3.5–5.1)
PROT SERPL-MCNC: 7.2 G/DL (ref 5.7–8.2)
RBC # BLD AUTO: 2.44 X10(6)UL
RH BLOOD TYPE: POSITIVE
SODIUM SERPL-SCNC: 138 MMOL/L (ref 136–145)
TROPONIN I SERPL HS-MCNC: 3 NG/L
WBC # BLD AUTO: 9.1 X10(3) UL (ref 4–11)

## 2025-01-17 PROCEDURE — 80053 COMPREHEN METABOLIC PANEL: CPT | Performed by: EMERGENCY MEDICINE

## 2025-01-17 PROCEDURE — 86850 RBC ANTIBODY SCREEN: CPT | Performed by: EMERGENCY MEDICINE

## 2025-01-17 PROCEDURE — 99285 EMERGENCY DEPT VISIT HI MDM: CPT

## 2025-01-17 PROCEDURE — 86920 COMPATIBILITY TEST SPIN: CPT

## 2025-01-17 PROCEDURE — 36415 COLL VENOUS BLD VENIPUNCTURE: CPT

## 2025-01-17 PROCEDURE — 93005 ELECTROCARDIOGRAM TRACING: CPT

## 2025-01-17 PROCEDURE — 80053 COMPREHEN METABOLIC PANEL: CPT

## 2025-01-17 PROCEDURE — 86900 BLOOD TYPING SEROLOGIC ABO: CPT

## 2025-01-17 PROCEDURE — 85025 COMPLETE CBC W/AUTO DIFF WBC: CPT | Performed by: EMERGENCY MEDICINE

## 2025-01-17 PROCEDURE — 86850 RBC ANTIBODY SCREEN: CPT

## 2025-01-17 PROCEDURE — 76856 US EXAM PELVIC COMPLETE: CPT | Performed by: EMERGENCY MEDICINE

## 2025-01-17 PROCEDURE — 85025 COMPLETE CBC W/AUTO DIFF WBC: CPT

## 2025-01-17 PROCEDURE — 84484 ASSAY OF TROPONIN QUANT: CPT | Performed by: EMERGENCY MEDICINE

## 2025-01-17 PROCEDURE — 86901 BLOOD TYPING SEROLOGIC RH(D): CPT

## 2025-01-17 PROCEDURE — 86900 BLOOD TYPING SEROLOGIC ABO: CPT | Performed by: EMERGENCY MEDICINE

## 2025-01-17 PROCEDURE — 93975 VASCULAR STUDY: CPT | Performed by: EMERGENCY MEDICINE

## 2025-01-17 PROCEDURE — 86901 BLOOD TYPING SEROLOGIC RH(D): CPT | Performed by: EMERGENCY MEDICINE

## 2025-01-17 PROCEDURE — 93010 ELECTROCARDIOGRAM REPORT: CPT

## 2025-01-17 PROCEDURE — 84703 CHORIONIC GONADOTROPIN ASSAY: CPT | Performed by: EMERGENCY MEDICINE

## 2025-01-17 PROCEDURE — 36430 TRANSFUSION BLD/BLD COMPNT: CPT

## 2025-01-17 RX ORDER — FERROUS SULFATE 325(65) MG
325 TABLET ORAL
Qty: 30 TABLET | Refills: 0 | Status: SHIPPED | OUTPATIENT
Start: 2025-01-17 | End: 2025-02-16

## 2025-01-17 RX ORDER — MEGESTROL ACETATE 40 MG/1
40 TABLET ORAL 2 TIMES DAILY
Qty: 60 TABLET | Refills: 0 | Status: SHIPPED | OUTPATIENT
Start: 2025-01-17 | End: 2025-02-16

## 2025-01-17 RX ORDER — MEGESTROL ACETATE 40 MG/1
40 TABLET ORAL ONCE
Status: COMPLETED | OUTPATIENT
Start: 2025-01-17 | End: 2025-01-18

## 2025-01-18 VITALS
WEIGHT: 246.94 LBS | RESPIRATION RATE: 13 BRPM | BODY MASS INDEX: 41.14 KG/M2 | HEART RATE: 73 BPM | OXYGEN SATURATION: 100 % | DIASTOLIC BLOOD PRESSURE: 70 MMHG | TEMPERATURE: 98 F | SYSTOLIC BLOOD PRESSURE: 127 MMHG | HEIGHT: 65 IN

## 2025-01-18 NOTE — ED PROVIDER NOTES
Patient Seen in: [facility] Emergency Department      History     Chief Complaint   Patient presents with    Bleeding      Menstral bleeding since      Stated Complaint:     Subjective:   HPI      Patient is a 44-year-old female Egyptian-speaking presents to ED for evaluation of heavy menstrual bleeding.  Patient for language speaking language line used for interpretation.  Patient states she has been bleeding since .  Patient states she is changing a pad or tampon every couple hours.  Denies any abdominal pain.  She complains of shortness of breath, weakness, intermittent chest pain.  Patient also complains of a mild headache.  She called her gynecologist but cannot get in until next week.  Patient has history of anemia in the past.  Hemoglobin performed at couple days ago and 8.2.    Objective:     Past Medical History:    Disorder of thyroid    Iron deficiency anemia secondary to blood loss (chronic)    Obesity              Past Surgical History:   Procedure Laterality Date    Appendectomy            Hysteroscopy,with sampling N/A 2023    D&C                Social History     Socioeconomic History    Marital status:    Tobacco Use    Smoking status: Never    Smokeless tobacco: Never   Vaping Use    Vaping status: Never Used   Substance and Sexual Activity    Alcohol use: Never    Drug use: Never    Sexual activity: Not Currently     Partners: Male   Other Topics Concern    Caffeine Concern No    Exercise Yes    Seat Belt No    Special Diet Yes    Stress Concern Yes    Weight Concern Yes                  Physical Exam     ED Triage Vitals [25 1815]   /62   Pulse 97   Resp 16   Temp 98.4 °F (36.9 °C)   Temp src Oral   SpO2 97 %   O2 Device None (Room air)       Current Vitals:   Vital Signs  BP: 127/70  Pulse: 73  Resp: 13  Temp: 98 °F (36.7 °C)  Temp src: Oral  MAP (mmHg): 86    Oxygen Therapy  SpO2: 100 %  O2 Device: None (Room  air)        Physical Exam  GENERAL: No acute distress, well appearing and non-toxic, Alert and oriented X 3   HEENT: Normocephalic, atraumatic.  Moist mucous membranes.  Pupils equal round reactive to light and accommodation, extraocular motion is intact, sclerae white, conjunctiva is pale.  Oropharynx is unremarkable, no exudate.  NECK: Supple, trachea midline, no lymphadenopathy.   LUNG: Lungs clear to auscultation bilaterally, no wheezing, no rales, no rhonchi.  CARDIOVASCULAR: Regular rate and rhythm.  Normal S1S2.  No S3S4 or murmur.  ABDOMEN: Bowel sounds are present. Soft. nondistended, no pulsatile masses. nontender  MUSCULOSKELETAL: No calf tenderness.  Dorsalis and Posterior Tibial pulses present. No clubbing. No cyanosis.  No edema.   SKIN EXAMINATIoN: Pale  NEUROLOGICAL:  Motor strength intact all groups.  normal sensation, speech intact  Pelvic: Patient has mild to moderate amount of blood in the vaginal vault  ED Course     Labs Reviewed   CBC WITH DIFFERENTIAL WITH PLATELET - Abnormal; Notable for the following components:       Result Value    RBC 2.44 (*)     HGB 7.1 (*)     HCT 21.9 (*)     All other components within normal limits   COMP METABOLIC PANEL (14) - Abnormal; Notable for the following components:    Bilirubin, Total 0.2 (*)     All other components within normal limits   HCG, BETA SUBUNIT, QUAL - Normal   TROPONIN I HIGH SENSITIVITY - Normal   TYPE AND SCREEN    Narrative:     The following orders were created for panel order Type and screen.  Procedure                               Abnormality         Status                     ---------                               -----------         ------                     ABORH (Blood Type)[[id number]]                               Final result               Antibody Screen[[id number]]                                  Final result                 Please view results for these tests on the individual orders.   ABORH (BLOOD TYPE)   ANTIBODY SCREEN    PREPARE RBC   Hemoglobin 7.1  EKG    Rate, intervals and axes as noted on EKG Report.  Rate: 83  Rhythm: Sinus Rhythm  Reading: No acute changes         Pelvic ultrasound read by [facility] rad radiology shows endometrial thickening of 2.1 cm.  Nonenlarged ovaries with normal vascular flow       MDM      Patient is a 44-year-old female presents to ED for evaluation of menometrorrhagia.  Patient laboratory test performed showing hemoglobin of 7.1.  Pregnancy test negative.  Spoke with gynecology who recommended pelvic ultrasound showing thickened endometrium.  Patient advised to have blood transfusion given hemoglobin of 7.1 with symptomatic anemia.  Risks and benefits of blood transfusion were discussed with patient. Patient informed of possibility of communicable diseases such as HIV/hepatitis with blood transfusion. Patient consented for blood transfusion.  1 unit of packed cells administered.  Pregnancy test negative.  Gynecologist recommended Megace 40 mg twice daily and she was advised to follow-up next week.  First dose of Megace given here.  Critical care time was 35 minutes. This critical care time is exclusive of procedures critical care time includes monitoring of patient's cardiopulmonary and hemodynamic status, interpretation of laboratory values, and discussion of case with physician and consultants.    External and old record review was performed.  I reviewed laboratory test and hemoglobin 12.2 7/13/2024.  Reviewed pelvic ultrasound from April 2024 showing a left ovarian cyst        Medical Decision Making      Disposition and Plan     Clinical Impression:  1. Menometrorrhagia    2. Anemia, unspecified type         Disposition:  Discharge  1/18/2025 12:19 am    Follow-up:  Jeremy Pagan MD  [address] [place] [id number]  [phone]    Follow up in 3 day(s)            Medications Prescribed:  Discharge Medication List as of 1/18/2025 12:20 AM        START taking these medications     Details   !! Ferrous Sulfate 325 (65 Fe) MG Oral Tab Take 1 tablet (325 mg total) by mouth daily with breakfast., Normal, Disp-30 tablet, R-0      megestrol 40 MG Oral Tab Take 1 tablet (40 mg total) by mouth 2 (two) times daily., Normal, Disp-60 tablet, R-0       !! - Potential duplicate medications found. Please discuss with provider.              Supplementary Documentation:

## 2025-01-19 LAB
BLOOD TYPE BARCODE: 5100
UNIT VOLUME: 350 ML

## 2025-01-20 ENCOUNTER — OFFICE VISIT (OUTPATIENT)
Dept: OBGYN CLINIC | Facility: CLINIC | Age: 45
End: 2025-01-20
Payer: COMMERCIAL

## 2025-01-20 VITALS
BODY MASS INDEX: 42.1 KG/M2 | SYSTOLIC BLOOD PRESSURE: 130 MMHG | HEART RATE: 94 BPM | WEIGHT: 249.63 LBS | DIASTOLIC BLOOD PRESSURE: 82 MMHG | HEIGHT: 64.5 IN

## 2025-01-20 DIAGNOSIS — N93.9 ABNORMAL UTERINE BLEEDING (AUB): Primary | ICD-10-CM

## 2025-01-20 LAB
ATRIAL RATE: 83 BPM
P AXIS: 67 DEGREES
P-R INTERVAL: 168 MS
Q-T INTERVAL: 394 MS
QRS DURATION: 82 MS
QTC CALCULATION (BEZET): 462 MS
R AXIS: 57 DEGREES
T AXIS: 22 DEGREES
VENTRICULAR RATE: 83 BPM

## 2025-01-20 RX ORDER — MEDROXYPROGESTERONE ACETATE 10 MG
10 TABLET ORAL DAILY
Qty: 21 TABLET | Refills: 3 | Status: SHIPPED | OUTPATIENT
Start: 2025-01-20

## 2025-01-20 NOTE — PROGRESS NOTES
Subjective:  44 year old    Chief Complaint   Patient presents with    Vaginal Bleeding     Patient states having her period for a month straight since 2024.   Patient went to ER and was given medication to stop bleeding.      Translation through [facility]    Follow up to ED visit 25 with AUB.  Patient with history of similar issue 2023 with normal hysteroscopy.  Patient desired pregnancy so declined treatment at that time.  Normal monthly menses from 2023 until 2024, still attempting pregnancy.  Started menses at normal time but persistent heavy bleeding with severe anemia requiring blood transfusion in ED.  Started on Megace twice daily, and bleeding has stopped.  Ultrasound yesterday was unremarkable with 21 mm endometrial lining.    Review of Systems:  Pertinent items are noted in the HPI.    Objective:  /82   Pulse 94   Ht 64.5\"   Wt 249 lb 9.6 oz (113.2 kg)   LMP 2024 (Exact Date)     Physical Examination:  General appearance: Well dressed, well nourished in no apparent distress  Neurologic/Psychiatric: Alert and oriented to person, place and time, mood normal, affect appropriate  Abdomen: Soft, non-tender, non-distended, no masses, no hepatosplenomegaly, no hernias, no inguinal lymphadenopathy  Pelvic:    External genitalia- Normal, Bartholin's, urethra, skeins glands normal   Vagina- No vaginal lesions, no discharge   Cervix- No lesions, long/closed, no cervical motion tenderness   Uterus- Normal sized, anteverted, non-tender, no masses   Adnexa-  Non-tender, no masses    Ultrasound 25  PROCEDURE:  US PELVIS W DOPPLER (ZNM=[id number])  COMPARISON:  [address] [place] [address], , US PELVIS W EV (CPT=76856/22703), 4/10/2024, 10:47 AM.  INDICATIONS:  bleedign  TECHNIQUE:  Transabdominal imaging was performed of the pelvis.   Arterial and venous Doppler of the ovaries was also performed.  PATIENT STATED HISTORY: (As transcribed by Technologist)    FINDINGS:           UTERUS:  8.47 cm x 5.57 cm x 5.96 cm   Endometrium Thickness: 2.14 cm   The uterus appears normal in size, shape, and echogenicity.  RIGHT OVARY:  4.08 cm x 2.67 cm x 3.19 cm   3.2 x 1.7 x 2.2 cm cyst.  LEFT OVARY:  3.02 cm x 1.66 cm x 1.96 cm   The left ovary appears normal in size, shape, and echogenicity. No significant masses are identified.  CUL-DE-SAC:  Minimal free fluid.  OTHER:  Negative.    DOPPLER WAVE FORMS  FLOW:  There is normal arterial and venous Doppler wave forms in both ovaries.  The spectral analysis is within normal limits.  OTHER:  Negative.   Impression   CONCLUSION:    1. Moderately thickened endometrium is noted.  This may be due to phase of menstrual cycle.  No significant vascularity in the endometrium.  2. No evidence of torsion.     Assessment/Plan:  AUB with hx of normal hysteroscopy 11/2023.  Patient still undecided regarding using contraception.  Discussed treatment options.  As December menses was first abnormal menses in a year, option included expectant management, monthly Provera if desires pregnancy.  IUD or low dose OCP if desires contraception.  Patient did have a problem with IUD migration previously.  Patient desires monthly provera for now.  Recommend Megace twice weekly for one week, once weekly for one week, then monthly Provera.  Patient declines ROSE.    Total patient time was 30 minutes in reviewing paper/electronic records, reviewing test results from outside care provider, obtaining history, evaluating the patient, consultation, discussing treatment options, coordination of care and completing documentation. This included face to face and non-face to face actions. The patient's questions and concerns were addressed.      Recommend return to office for endometrial biopsy next available.      Diagnoses and all orders for this visit:    Abnormal uterine bleeding (AUB)  -     medroxyPROGESTERone Acetate (PROVERA) 10 MG Oral Tab; Take 1 tablet (10 mg total) by mouth  daily. For 7 days every month      Return for Endometrial biopsy next available..

## 2025-02-14 ENCOUNTER — OFFICE VISIT (OUTPATIENT)
Dept: OBGYN CLINIC | Facility: CLINIC | Age: 45
End: 2025-02-14
Payer: COMMERCIAL

## 2025-02-14 VITALS
HEIGHT: 64.5 IN | HEART RATE: 98 BPM | SYSTOLIC BLOOD PRESSURE: 128 MMHG | WEIGHT: 249 LBS | DIASTOLIC BLOOD PRESSURE: 82 MMHG | BODY MASS INDEX: 41.99 KG/M2

## 2025-02-14 DIAGNOSIS — N93.9 ABNORMAL UTERINE BLEEDING (AUB): Primary | ICD-10-CM

## 2025-02-14 DIAGNOSIS — N93.8 DUB (DYSFUNCTIONAL UTERINE BLEEDING): ICD-10-CM

## 2025-02-14 PROCEDURE — 58100 BIOPSY OF UTERUS LINING: CPT | Performed by: OBSTETRICS & GYNECOLOGY

## 2025-02-14 PROCEDURE — 88305 TISSUE EXAM BY PATHOLOGIST: CPT | Performed by: OBSTETRICS & GYNECOLOGY

## 2025-02-14 RX ORDER — MEDROXYPROGESTERONE ACETATE 10 MG
10 TABLET ORAL DAILY
Qty: 21 TABLET | Refills: 1 | Status: SHIPPED | OUTPATIENT
Start: 2025-02-14

## 2025-02-14 NOTE — PATIENT INSTRUCTIONS
INSTRUCTIONS AFTER ENDOMETRIAL BIOPSY    Mild cramping, spotting or light bleeding can be expected for a few days after the procedure.  You may take an over-the-counter pain medicine, such as acetaminophen (Tylenol), ibuprofen (Advil, Motrin), and naproxen (Aleve) if needed.  Read and follow all instructions on the label.  Avoid douching, using tampons, tub baths or having sex for 2-3 days after your procedure.    Tell your healthcare provider if you have any of the following:    - Excessive bleeding heavier than your usual cycle  - Foul smelling vaginal discharge  - Fever or chills  - Severe or persistent lower belly pain

## 2025-02-14 NOTE — PROGRESS NOTES
Subjective:  44 year old    Chief Complaint   Patient presents with    Procedure     Pt is here for an EMB procedure; consent signed      Translation through [facility]    Follow up to abnormal uterine bleeding.  Was seen in ED 25 with bldg x 2 wks and Hg 8.2.  had been on Megace, then provera and bleeding better.    Review of Systems:  Pertinent items are noted in the HPI.    Objective:  /82   Pulse 98   Ht 64.5\"   Wt 249 lb (112.9 kg)   LMP 2025 (Approximate)     Physical Examination:  General appearance: Well dressed, well nourished in no apparent distress  Neurologic/Psychiatric: Alert and oriented to person, place and time, mood normal, affect appropriate    Pelvic:    External genitalia- Normal, Bartholin's, urethra, skeins glands normal   Vagina- No vaginal lesions, no discharge   Cervix- No lesions, long/closed, no cervical motion tenderness    Assessment/Plan:  AUB/DUB with hx of normal hysteroscopy 2023. For endometrial biopsy today.  Discussed options for treatment and patient would like to continue monthly provera 10 mg for 7 days every month.  Advised that monthly provera will not prevent pregnancy.    Diagnoses and all orders for this visit:    Abnormal uterine bleeding (AUB)  -     BIOPSY OF UTERUS LINING (32797)  -     Specimen to Pathology Tissue; Future    DUB (dysfunctional uterine bleeding)  -     medroxyPROGESTERone Acetate (PROVERA) 10 MG Oral Tab; Take 1 tablet (10 mg total) by mouth daily. For 7 days every month      Return in about 6 months (around 2025), or if symptoms worsen or fail to improve, for Annual Well Woman Exam.

## 2025-02-14 NOTE — PROCEDURES
Endometrial Biopsy Procedure Note    Indication and Diagnosis: AUB    Procedure Details    Urine pregnancy test negative.  The risks (including infection, bleeding, pain, and uterine perforation) and benefits of the procedure were explained to the patient and informed consent was obtained.    The patient was placed in the dorsal lithotomy position.  Bimanual exam showed the uterus to be in the anteverted position.  The cervix was prepped with betadine solution.  2% Lidocaine jellly was applied to the anterior cervix and a tenaculum placed.    Using sterile technique, endometrial biopsy was performed using the pipelle catheter without complications.  The uterus sounded to 8 cm's.  A small amount of tissue was obtained and sent to pathology for examination.    Condition:  Stable    Complications:  None    Plan:  The patient was advised to call for any fever or for prolonged or severe pain or bleeding. She was advised to use ibuprofen as needed for mild to moderate pain. She was advised to avoid vaginal intercourse for 48 hours or until the bleeding has completely stopped.

## 2025-04-01 DIAGNOSIS — N93.8 DUB (DYSFUNCTIONAL UTERINE BLEEDING): ICD-10-CM

## 2025-04-01 RX ORDER — MEDROXYPROGESTERONE ACETATE 10 MG
10 TABLET ORAL DAILY
Qty: 21 TABLET | Refills: 1 | Status: CANCELLED | OUTPATIENT
Start: 2025-04-01

## 2025-04-02 NOTE — TELEPHONE ENCOUNTER
Last office visit: 2/14/2025  Follow-up recommended: RTC for annual (8/14/2025)  Last refill date:   Medication Quantity Refills Start End   medroxyPROGESTERone Acetate (PROVERA) 10 MG Oral Tab 21 tablet 1 2/14/2025 --   Sig:   Take 1 tablet (10 mg total) by mouth daily. For 7 days every month       Next appt.: 5/6/2025  Refill denied per protocol.  6 month supply was sent on 2/14/25.  Patient notified by [employer] message.

## 2025-04-14 ENCOUNTER — OFFICE VISIT (OUTPATIENT)
Dept: INTERNAL MEDICINE CLINIC | Facility: CLINIC | Age: 45
End: 2025-04-14
Payer: COMMERCIAL

## 2025-04-14 VITALS
SYSTOLIC BLOOD PRESSURE: 120 MMHG | DIASTOLIC BLOOD PRESSURE: 70 MMHG | TEMPERATURE: 98 F | OXYGEN SATURATION: 97 % | HEART RATE: 85 BPM | BODY MASS INDEX: 43 KG/M2 | WEIGHT: 254.63 LBS

## 2025-04-14 DIAGNOSIS — D64.9 ANEMIA, UNSPECIFIED TYPE: ICD-10-CM

## 2025-04-14 DIAGNOSIS — E66.01 CLASS 3 SEVERE OBESITY IN ADULT, UNSPECIFIED BMI, UNSPECIFIED OBESITY TYPE, UNSPECIFIED WHETHER SERIOUS COMORBIDITY PRESENT (HCC): ICD-10-CM

## 2025-04-14 DIAGNOSIS — E66.813 CLASS 3 SEVERE OBESITY IN ADULT, UNSPECIFIED BMI, UNSPECIFIED OBESITY TYPE, UNSPECIFIED WHETHER SERIOUS COMORBIDITY PRESENT (HCC): ICD-10-CM

## 2025-04-14 DIAGNOSIS — Z12.31 VISIT FOR SCREENING MAMMOGRAM: ICD-10-CM

## 2025-04-14 DIAGNOSIS — R00.2 PALPITATIONS: ICD-10-CM

## 2025-04-14 DIAGNOSIS — N93.9 ABNORMAL UTERINE BLEEDING (AUB): Primary | ICD-10-CM

## 2025-04-14 DIAGNOSIS — Z01.89 ROUTINE LAB DRAW: ICD-10-CM

## 2025-04-14 RX ORDER — PNV NO.95/FERROUS FUM/FOLIC AC 28MG-0.8MG
1 TABLET ORAL DAILY
COMMUNITY

## 2025-04-14 NOTE — PROGRESS NOTES
Subjective:   Donal Berry is a 44 year old female  who presents for Follow - Up (Vaginal bleeding/Palpations /Breast discomfort/sensitivity )         The following individual(s) verbally consented to be recorded using ambient AI listening technology and understand that they can each withdraw their consent to this listening technology at any point by asking the clinician to turn off or pause the recording:    Patient name: Donal Berry        History of Present Illness  The patient, with a history of abnormal uterine bleeding and anemia, presents for follow-up. She reports a recent episode of heavy bleeding in January, which led to a significant drop in her hemoglobin level to 6, necessitating a blood transfusion. She was evaluated by a gynecologist. The patient opted for hormonal treatment with Provera. She underwent an endometrial biopsy, which was normal. Her menstrual cycle has been better.     In addition, the patient reports intermittent chest sensations described as a 'fluttering' or 'bubble' sensation in the middle of the chest. These symptoms have not occurred for approximately 15 days.       Lastly, the patient mentions a thyroid nodule, which was discovered on a previous evaluation. She is scheduled to see an endocrinologist for further evaluation. Repeat thyroid US due 12/2025    History/Other:    Chief Complaint Reviewed and Verified  Nursing Notes Reviewed and   Verified  Tobacco Reviewed  Allergies Reviewed  Medications Reviewed    Problem List Reviewed  Medical History Reviewed  Surgical History   Reviewed  OB Status Reviewed  Family History Reviewed         Current Medications[1]    Review of Systems:  Pertinent items are noted in HPI.  A comprehensive 10 point review of systems was completed.  Pertinent positives and negatives noted in the the HPI.        Objective:   /70 (BP Location: Left arm, Patient Position: Sitting, Cuff Size: large)   Pulse 85   Temp 98 °F  (36.7 °C) (Temporal)   Wt 254 lb 9.6 oz (115.5 kg)   LMP 03/31/2025 (Exact Date)   SpO2 97%   BMI 43.03 kg/m²  Estimated body mass index is 43.03 kg/m² as calculated from the following:    Height as of 2/14/25: 5' 4.5\" (1.638 m).    Weight as of this encounter: 254 lb 9.6 oz (115.5 kg).    Physical Exam  GENERAL: Alert, cooperative,no acute distress.  CHEST: Clear to auscultation bilaterally, no wheezes, rhonchi, or crackles.  CARDIOVASCULAR: Normal heart rate and rhythm, S1 and S2 normal without murmurs.  BREAST: Breasts examined, no abnormalities detected.  NEUROLOGICAL: Cranial nerves grossly intact, moves all extremities without gross motor or sensory deficit.    Assessment & Plan:     Assessment & Plan  Abnormal uterine bleeding  Recurrent bleeding causing severe anemia, managed with medroxyprogesterone acetate. Normal endometrial biopsy. Differential includes endometriosis, adenomyosis, PCOS. Obesity is a factor.   - Continue medroxyprogesterone acetate as directed by gyne.   -follow-up with gyne   - Monitor hemoglobin and anemia.    anemia  Anemia secondary to uterine bleeding, requiring transfusion.   - Check blood tests for anemia status.  - Continue a iron supplementation.    Palpitations  Intermittent palpitations likely due to anemia. Normal EKG 1/2025. Symptoms improved. Discussed Zio Patch for further evaluation.  - Order Zio Patch for one-week cardiac monitoring.  -cardiology referral     Obesity  - Refer to nutritionist for dietary management.      Thyroid nodule  Nodule identified, follow-up ultrasound recommended. Endocrinologist referral in place.  - Maintain endocrinologist appointment April 28.  - Repeat thyroid ultrasound in December 2025.      This note was created utilizing [employer] Mess[employer] speech recognition software which may lead to grammatical errors/typos.   If any word or phrase is confusing, it is likely due to recognition error. Please ask the provider for clarification.      Tamica Charles  MD Amy         [1]   Current Outpatient Medications   Medication Sig Dispense Refill    Ferrous Sulfate (IRON) 325 (65 Fe) MG Oral Tab Take 1 tablet by mouth in the morning.      medroxyPROGESTERone Acetate (PROVERA) 10 MG Oral Tab Take 1 tablet (10 mg total) by mouth daily. For 7 days every month 21 tablet 1    vitamin B-12 50 MCG Oral Tab Take 1 tablet (50 mcg total) by mouth daily.      Magnesium 100 MG Oral Tab Take by mouth.      Ascorbic Acid (VITAMIN C) 100 MG Oral Tab Take 1 tablet (100 mg total) by mouth daily.      Multiple Vitamin (MULTI-DAY VITAMINS) Oral Tab Take 1 tablet by mouth daily.

## 2025-04-28 ENCOUNTER — OFFICE VISIT (OUTPATIENT)
Facility: CLINIC | Age: 45
End: 2025-04-28
Payer: COMMERCIAL

## 2025-04-28 VITALS
HEIGHT: 64.5 IN | HEART RATE: 83 BPM | WEIGHT: 249.13 LBS | OXYGEN SATURATION: 99 % | BODY MASS INDEX: 42.01 KG/M2 | SYSTOLIC BLOOD PRESSURE: 128 MMHG | DIASTOLIC BLOOD PRESSURE: 82 MMHG | RESPIRATION RATE: 18 BRPM

## 2025-04-28 DIAGNOSIS — R94.6 ABNORMAL THYROID FUNCTION TEST: Primary | ICD-10-CM

## 2025-04-28 DIAGNOSIS — E04.2 MULTIPLE THYROID NODULES: ICD-10-CM

## 2025-04-28 PROCEDURE — 99205 OFFICE O/P NEW HI 60 MIN: CPT | Performed by: STUDENT IN AN ORGANIZED HEALTH CARE EDUCATION/TRAINING PROGRAM

## 2025-04-28 NOTE — H&P
Endocrinology Clinic Note    Name: Donal Berry    Date: 4/28/2025       HISTORY OF PRESENT ILLNESS   Donal Berry is a 44 year old female with PMHx significant for abnormal uterine bleeding, obesity who presents for initial endocrine consultation for thyroid evaluation and thyroid nodules.  History assisted by [place] phone .  The patient reports that she was started on thyroid hormone in 2005 when she was living in [facility], she had at that time been newly  and was gaining a lot of weight unexpectedly.  Reports that she was seen by an endocrinologist at that time and is unsure exactly what testing she was done but was started on levothyroxine (for management of symptoms/metabolism?).  Reports that she was on levothyroxine till 2019, after moving to [place] her dose was down titrated sequentially until she was down to 25-50 mcg daily.  At that time she was experiencing severe anxiety around the time of the pandemic, she was seen by an endocrinologist who told her that her levels were normal and suggested that she discontinue levothyroxine.  She has since been off of levothyroxine and has been biochemically euthyroid since then.  She denies any history of brain radiation, brain damage, traumatic brain injury.  She does have severe abnormal uterine bleeding and related blood loss anemia for which she required blood transfusion in January 2025.  She has known significant iron deficiency.  Reports that she continues to experience fatigue, hair loss, cold intolerance, dry skin and was wondering if she needs to be back on thyroid medication.  Her primary care physician ordered an ultrasound of her thyroid which revealed 2 small nodules not meeting criteria for biopsy.  Reports her mother had part of her thyroid removed in the 1990s. She is concerned about being overweight.  Further data as below.    PAST MEDICAL HISTORY:   Past Medical History[1]    PAST SURGICAL HISTORY:   Past  Surgical History[2]    CURRENT MEDICATIONS:    Current Medications[3]      ALLERGIES:  Allergies[4]    SOCIAL HISTORY:    Social Hx on file[5]    FAMILY HISTORY:   Family History[6]      REVIEW OF SYSTEMS:  Ten point review of systems has been performed and is otherwise negative and/or non-contributory, except as described above.      PHYSICAL EXAM:   Vitals:    04/28/25 1244   BP: 128/82   Pulse: 83   Resp: 18   SpO2: 99%   Weight: 249 lb 1.9 oz (113 kg)   Height: 5' 4.5\" (1.638 m)     BMI: Body mass index is 42.1 kg/m².     CONSTITUTIONAL:  awake, alert, cooperative, no apparent distress, and appears stated age  PSYCH: normal affect  EYES:  No proptosis,  conjunctiva normal  ENT:  Normocephalic, atraumatic  NECK:  Supple, symmetrical, thyroid not enlarged and no tenderness  LUNGS: breathing comfortably  CARDIOVASCULAR:  regular rate   ABDOMEN:  soft  SKIN:  no rashes and no lesions  EXTREMITIES: no edema      DATA:     Pertinent data reviewed 4/28/2025.    US THYROID (12/31/2024)     FINDINGS:       MEASUREMENTS:  RIGHT LOBE:  4.7 x 0.9 x 1.1 cm  LEFT LOBE:  4.2 x 0.8 x 1.5 cm  ISTHMUS:  0.3 cm     NODULES:  7 x 4 x 5 mm nodule right upper thyroid lobe, This nodule is solid hypoechoic, well-defined, wider than tall, without calcification, and is a TIRADS category 4 nodule.  10 x 4 x 8 mm nodule right lower thyroid lobe, This nodule is solid  echogenic, well-defined, wider than tall, with punctate calcification, and is a TIRADS category 4     OTHER:  None.      Impression   CONCLUSION:  Category 4 thyroid nodules are present, each under 1.5 cm in size.  These could be followed according to below guidelines unless otherwise clinically indicated.         ASSESSMENT AND PLAN:    #Thyroid nodules  - Ultrasound thyroid was performed per PCP in December 2024 and showed 2 thyroid nodules, the 0.7 cm right upper thyroid lobe nodule TR 4 and a 1 cm right lower lobe thyroid nodule TR 4, neither of which met criteria for FNA  at that time  - Patient does not have any compressive neck symptoms  - Reviewed pathophysiology of thyroid nodules in general approach to management/follow-up  - She does not have any biochemical evidence of subclinical or mickey hyperthyroidism, therefore no evidence of toxic nodule  - She does not have any symptoms from her nodules at this time  - Recommend repeat ultrasound in December 2025 for surveillance    #Abnormal thyroid function  - Patient reports she was on levothyroxine between 2005 and 2019, started in [facility] and discontinued after she moved to [place]  - Per history, it does not seem that she was diagnosed with 2 hypothyroidism but was started on levothyroxine for \"metabolism\"  - Since being off levothyroxine for over 5 years, she is generally biochemically euthyroid with TSH in mid normal range  - She denies any history of traumatic brain injury, massive transfusion, brain radiation, or any other reason to suspect pituitary dysfunction  - She reports multiple symptoms that can be seen with hypothyroidism including cold intolerance, dry skin, hair loss, fatigue however also with noted severe iron deficiency status post blood transfusion in January 2025 due to severe uterine bleeding.  Symptoms of severe iron deficiency can mimic hypothyroidism.  - Will repeat full thyroid panel along with antithyroid antibodies and T4 by dialysis to ensure there is no assay interference   - Will also follow-up labs ordered by PCP including iron levels, vitamin D, A1c, that can contribute to fatigue and difficulty losing weight  - If the levels are normal, can consider obtaining a 1 mg DST or minute salivary cortisol as patient is concerned about her inability to lose weight  - If there is no evidence for hormonal derangement, consider follow-up with weight management    The above plan was discussed in detail with the patient who verbalized understanding and agreement.      A total of 60 minutes was spent today  on obtaining history, reviewing outside records, reviewing pertinent labs/imaging, reviewing relevant pathophysiology with patient, evaluating patient, providing multiple treatment options, communicating with patient's other providers as appropriate, and completing documentation and orders.      Lina Arguelles DO  [facility]  2025     Note to patient: The  Cures Act makes medical notes like these available to patients in the interest of transparency. However, be advised this is a medical document. It is intended as peer to peer communication. It is written in medical language and may contain abbreviations or verbiage that are unfamiliar. It may appear blunt or direct. Medical documents are intended to carry relevant information, facts as evident, and the clinical opinion of the practitioner.      In reviewing this note, please be advised that Dragon Voice Recognition software used to dictate the note may have made errors in recognizing some of the words or phrases.            [1]   Past Medical History:   Disorder of thyroid    Iron deficiency anemia secondary to blood loss (chronic)    Obesity   [2]   Past Surgical History:  Procedure Laterality Date    Appendectomy            Hysteroscopy,with sampling N/A 2023    D&C   [3]   Current Outpatient Medications   Medication Sig Dispense Refill    Ferrous Sulfate (IRON) 325 (65 Fe) MG Oral Tab Take 1 tablet by mouth in the morning.      medroxyPROGESTERone Acetate (PROVERA) 10 MG Oral Tab Take 1 tablet (10 mg total) by mouth daily. For 7 days every month 21 tablet 1    vitamin B-12 50 MCG Oral Tab Take 1 tablet (50 mcg total) by mouth daily.      Magnesium 100 MG Oral Tab Take by mouth.      Ascorbic Acid (VITAMIN C) 100 MG Oral Tab Take 1 tablet (100 mg total) by mouth daily.      Multiple Vitamin (MULTI-DAY VITAMINS) Oral Tab Take 1 tablet by mouth daily.     [4] No Known Allergies  [5]   Social History  Socioeconomic History     Marital status:    Tobacco Use    Smoking status: Never    Smokeless tobacco: Never   Vaping Use    Vaping status: Never Used   Substance and Sexual Activity    Alcohol use: Never    Drug use: Never    Sexual activity: Not Currently     Partners: Male   Other Topics Concern    Caffeine Concern Yes    Exercise No    Seat Belt No    Special Diet No    Stress Concern Yes    Weight Concern Yes   [6] History reviewed. No pertinent family history.

## 2025-04-28 NOTE — PATIENT INSTRUCTIONS
Please do your lab work whenever you have time to get your accurate thyroid labs.   Please schedule your thyroid ultrasound for December 2025. I will see you after that to review the ultrasound.    General follow up information:  Please let us know if you require any refills at least 1 week prior to your medication running out. If you do run out of medication, please call our office ASAP to request refills (do not wait until your follow up).  Please call us if you experience any problems with insurance coverage of medication, lab work, or imaging.   Lab results and imaging will typically be reviewed at follow up appointments, or within 3-5 business days of ALL results being in if you do not have an appointment scheduled in the near future. Our office will contact you for any abnormal results requiring more urgent follow up or action.   The on-call pager is for urgent matters only. If you are a type 1 diabetic and run out of insulin after business hours 8AM-4PM, you may call the on-call pager for a refill to a 24 hour pharmacy. If you have adrenal insufficiency and run out of steroids, you may call the on-call pager for a refill to a 24 hour pharmacy. All other refill requests should be requested during business hours.    Return Visit   [] Dr. Arguelles in January 2025  [] Central scheduling # for ultrasound

## 2025-05-06 ENCOUNTER — LAB ENCOUNTER (OUTPATIENT)
Dept: LAB | Facility: HOSPITAL | Age: 45
End: 2025-05-06
Attending: INTERNAL MEDICINE
Payer: COMMERCIAL

## 2025-05-06 DIAGNOSIS — N93.9 ABNORMAL UTERINE BLEEDING (AUB): ICD-10-CM

## 2025-05-06 DIAGNOSIS — D64.9 ANEMIA, UNSPECIFIED TYPE: ICD-10-CM

## 2025-05-06 DIAGNOSIS — Z01.89 ROUTINE LAB DRAW: ICD-10-CM

## 2025-05-06 DIAGNOSIS — R94.6 ABNORMAL THYROID FUNCTION TEST: ICD-10-CM

## 2025-05-06 LAB
ALBUMIN SERPL-MCNC: 4.8 G/DL (ref 3.2–4.8)
ALBUMIN/GLOB SERPL: 1.5 {RATIO} (ref 1–2)
ALP LIVER SERPL-CCNC: 69 U/L (ref 37–98)
ALT SERPL-CCNC: 16 U/L (ref 10–49)
ANION GAP SERPL CALC-SCNC: 10 MMOL/L (ref 0–18)
AST SERPL-CCNC: 23 U/L (ref ?–34)
BASOPHILS # BLD AUTO: 0.04 X10(3) UL (ref 0–0.2)
BASOPHILS NFR BLD AUTO: 0.6 %
BILIRUB SERPL-MCNC: 0.6 MG/DL (ref 0.3–1.2)
BILIRUB UR QL STRIP.AUTO: NEGATIVE
BUN BLD-MCNC: 13 MG/DL (ref 9–23)
CALCIUM BLD-MCNC: 9.3 MG/DL (ref 8.7–10.6)
CHLORIDE SERPL-SCNC: 102 MMOL/L (ref 98–112)
CHOLEST SERPL-MCNC: 155 MG/DL (ref ?–200)
CLARITY UR REFRACT.AUTO: CLEAR
CO2 SERPL-SCNC: 25 MMOL/L (ref 21–32)
COLOR UR AUTO: COLORLESS
CREAT BLD-MCNC: 0.79 MG/DL (ref 0.55–1.02)
EGFRCR SERPLBLD CKD-EPI 2021: 95 ML/MIN/1.73M2 (ref 60–?)
EOSINOPHIL # BLD AUTO: 0.39 X10(3) UL (ref 0–0.7)
EOSINOPHIL NFR BLD AUTO: 6.3 %
ERYTHROCYTE [DISTWIDTH] IN BLOOD BY AUTOMATED COUNT: 14.5 %
EST. AVERAGE GLUCOSE BLD GHB EST-MCNC: 108 MG/DL (ref 68–126)
FASTING PATIENT LIPID ANSWER: YES
FASTING STATUS PATIENT QL REPORTED: YES
FOLATE SERPL-MCNC: 31.2 NG/ML (ref 5.4–?)
GLOBULIN PLAS-MCNC: 3.1 G/DL (ref 2–3.5)
GLUCOSE BLD-MCNC: 95 MG/DL (ref 70–99)
GLUCOSE UR STRIP.AUTO-MCNC: NORMAL MG/DL
HBA1C MFR BLD: 5.4 % (ref ?–5.7)
HCT VFR BLD AUTO: 37.8 % (ref 35–48)
HDLC SERPL-MCNC: 48 MG/DL (ref 40–59)
HGB BLD-MCNC: 12.4 G/DL (ref 12–16)
IMM GRANULOCYTES # BLD AUTO: 0.02 X10(3) UL (ref 0–1)
IMM GRANULOCYTES NFR BLD: 0.3 %
IRON SATN MFR SERPL: 18 % (ref 15–50)
IRON SERPL-MCNC: 63 UG/DL (ref 50–170)
KETONES UR STRIP.AUTO-MCNC: NEGATIVE MG/DL
LDLC SERPL CALC-MCNC: 87 MG/DL (ref ?–100)
LEUKOCYTE ESTERASE UR QL STRIP.AUTO: NEGATIVE
LYMPHOCYTES # BLD AUTO: 1.37 X10(3) UL (ref 1–4)
LYMPHOCYTES NFR BLD AUTO: 22.2 %
MCH RBC QN AUTO: 27.7 PG (ref 26–34)
MCHC RBC AUTO-ENTMCNC: 32.8 G/DL (ref 31–37)
MCV RBC AUTO: 84.4 FL (ref 80–100)
MONOCYTES # BLD AUTO: 0.49 X10(3) UL (ref 0.1–1)
MONOCYTES NFR BLD AUTO: 7.9 %
NEUTROPHILS # BLD AUTO: 3.86 X10 (3) UL (ref 1.5–7.7)
NEUTROPHILS # BLD AUTO: 3.86 X10(3) UL (ref 1.5–7.7)
NEUTROPHILS NFR BLD AUTO: 62.7 %
NITRITE UR QL STRIP.AUTO: NEGATIVE
NONHDLC SERPL-MCNC: 107 MG/DL (ref ?–130)
OSMOLALITY SERPL CALC.SUM OF ELEC: 284 MOSM/KG (ref 275–295)
PH UR STRIP.AUTO: 6 [PH] (ref 5–8)
PLATELET # BLD AUTO: 361 10(3)UL (ref 150–450)
POTASSIUM SERPL-SCNC: 3.9 MMOL/L (ref 3.5–5.1)
PROT SERPL-MCNC: 7.9 G/DL (ref 5.7–8.2)
PROT UR STRIP.AUTO-MCNC: NEGATIVE MG/DL
RBC # BLD AUTO: 4.48 X10(6)UL (ref 3.8–5.3)
RBC UR QL AUTO: NEGATIVE
SODIUM SERPL-SCNC: 137 MMOL/L (ref 136–145)
SP GR UR STRIP.AUTO: 1.01 (ref 1–1.03)
T3 SERPL-MCNC: 0.79 NG/ML (ref 0.6–1.81)
T4 FREE SERPL-MCNC: 0.9 NG/DL (ref 0.8–1.7)
THYROGLOB SERPL-MCNC: 34 U/ML (ref ?–60)
THYROPEROXIDASE AB SERPL-ACNC: 42 U/ML (ref ?–60)
TOTAL IRON BINDING CAPACITY: 347 UG/DL (ref 250–425)
TRANSFERRIN SERPL-MCNC: 279 MG/DL (ref 250–380)
TRIGL SERPL-MCNC: 109 MG/DL (ref 30–149)
TSI SER-ACNC: 1.17 UIU/ML (ref 0.55–4.78)
UROBILINOGEN UR STRIP.AUTO-MCNC: NORMAL MG/DL
VIT B12 SERPL-MCNC: >2000 PG/ML (ref 211–911)
VIT D+METAB SERPL-MCNC: 30.7 NG/ML (ref 30–100)
VLDLC SERPL CALC-MCNC: 17 MG/DL (ref 0–30)
WBC # BLD AUTO: 6.2 X10(3) UL (ref 4–11)

## 2025-05-06 PROCEDURE — 85025 COMPLETE CBC W/AUTO DIFF WBC: CPT

## 2025-05-06 PROCEDURE — 81003 URINALYSIS AUTO W/O SCOPE: CPT

## 2025-05-06 PROCEDURE — 86800 THYROGLOBULIN ANTIBODY: CPT

## 2025-05-06 PROCEDURE — 84443 ASSAY THYROID STIM HORMONE: CPT

## 2025-05-06 PROCEDURE — 83540 ASSAY OF IRON: CPT

## 2025-05-06 PROCEDURE — 84439 ASSAY OF FREE THYROXINE: CPT

## 2025-05-06 PROCEDURE — 82306 VITAMIN D 25 HYDROXY: CPT

## 2025-05-06 PROCEDURE — 82746 ASSAY OF FOLIC ACID SERUM: CPT

## 2025-05-06 PROCEDURE — 80053 COMPREHEN METABOLIC PANEL: CPT

## 2025-05-06 PROCEDURE — 86376 MICROSOMAL ANTIBODY EACH: CPT

## 2025-05-06 PROCEDURE — 80061 LIPID PANEL: CPT

## 2025-05-06 PROCEDURE — 82607 VITAMIN B-12: CPT

## 2025-05-06 PROCEDURE — 84480 ASSAY TRIIODOTHYRONINE (T3): CPT

## 2025-05-06 PROCEDURE — 83550 IRON BINDING TEST: CPT

## 2025-05-06 PROCEDURE — 36415 COLL VENOUS BLD VENIPUNCTURE: CPT

## 2025-05-06 PROCEDURE — 83036 HEMOGLOBIN GLYCOSYLATED A1C: CPT

## 2025-05-12 LAB — T4 FREE DIALYSIS/MS: 0.77 NG/DL

## (undated) DEVICE — PREMIUM WET SKIN PREP TRAY: Brand: MEDLINE INDUSTRIES, INC.

## (undated) DEVICE — ELITE HYSTEROSCOPE SEAL: Brand: TRUCLEAR

## (undated) DEVICE — MEDI-VAC NON-CONDUCTIVE SUCTION TUBING: Brand: CARDINAL HEALTH

## (undated) DEVICE — SOLUTION IRRIG 1000ML 0.9% NACL USP BTL

## (undated) DEVICE — SPECIMEN SOCK - STANDARD: Brand: MEDI-VAC

## (undated) DEVICE — SET TB INFLO FOR TRUCLEAR SYS HYSTEROLUX

## (undated) DEVICE — 2000CC GUARDIAN II: Brand: GUARDIAN

## (undated) DEVICE — HYSTEROSCOPIC OUTFLOW TUBE SET

## (undated) DEVICE — STERILE POLYISOPRENE POWDER-FREE SURGICAL GLOVES: Brand: PROTEXIS

## (undated) DEVICE — SOLUTION IRRIG 3000ML 0.9% NACL FLX CONT

## (undated) DEVICE — SLEEVE COMPR M KNEE LEN SGL USE KENDALL SCD

## (undated) DEVICE — GYN CDS: Brand: MEDLINE INDUSTRIES, INC.

## (undated) NOTE — LETTER
11/10/23    Re: Armando Altamirano  : 11/3/1980    To Whom It May Concern:  Armando Altamirano is currently under my care and is scheduled for surgery on 2023. Please excuse her spouse, Lidia Bingham, from work on this date so he may accompany Myrna Rivas to her procedure. If you have any questions concerning this letter, please feel free to contact my office.       Sincerely yours,      Alexa Yoo MD

## (undated) NOTE — LETTER
Donal Berry, :11/3/1980    CONSENT FOR PROCEDURE/SEDATION    1. I authorize the performance upon Donal Berry  the following: Endometrial Biopsy    2. I authorize Dr. Jeremy Pagan MD (and whomever is designated as the doctor’s assistant), to perform the above-mentioned procedures.    3. If any unforeseen conditions arise during this procedure calling for additional  procedures, operations, or medications (including anesthesia and blood transfusion), I further request and authorize the doctor to do whatever he/she deems advisable in my interest.    4. I consent to the taking and reproduction of any photographs in the course of this procedure for professional purposes.    5. I consent to the administration of such sedation as may be considered necessary or advisable by the physician responsible for this service, with the exception of ______________________________________________________    6. I have been informed by my doctor of the nature and purpose of this procedure sedation, possible alternative methods of treatment, risk involved and possible complications.    7. If I have a Do Not Resuscitate (DNR) order in place, the physician and I (or the individual authorized to consent on my behalf) will discuss and agree as to whether the Do Not Resuscitate (DNR) order will remain in effect or will be discontinued during the performance of the procedure and the applicable recovery period. If the Do Not Resuscitate (DNR) order is discontinued and is to be reinstated following the procedure/recovery period, the physician will determine when the applicable recovery period ends for purposes of reinstating the Do Not Resuscitate (DNR) order.    Signature of Patient:_______________________________________________    Signature of person authorized to consent for patient:  _______________________________________________________________    Relationship to patient:  ____________________________________________    Witness: _________________________________________ Date:___________     Physician Signature: _______________________________ Date:___________